# Patient Record
Sex: FEMALE | Race: ASIAN | NOT HISPANIC OR LATINO | URBAN - METROPOLITAN AREA
[De-identification: names, ages, dates, MRNs, and addresses within clinical notes are randomized per-mention and may not be internally consistent; named-entity substitution may affect disease eponyms.]

---

## 2018-10-01 ENCOUNTER — OUTPATIENT (OUTPATIENT)
Dept: OUTPATIENT SERVICES | Facility: HOSPITAL | Age: 83
LOS: 1 days | End: 2018-10-01
Payer: MEDICAID

## 2018-10-01 PROCEDURE — G9001: CPT

## 2018-10-17 ENCOUNTER — INPATIENT (INPATIENT)
Facility: HOSPITAL | Age: 83
LOS: 1 days | Discharge: HOME | DRG: 187 | End: 2018-10-19
Attending: SURGERY | Admitting: SURGERY
Payer: MEDICAID

## 2018-10-17 ENCOUNTER — EMERGENCY (EMERGENCY)
Facility: HOSPITAL | Age: 83
LOS: 1 days | Discharge: ANOTHER HOSPITAL | End: 2018-10-17
Attending: EMERGENCY MEDICINE
Payer: MEDICAID

## 2018-10-17 VITALS
TEMPERATURE: 98 F | OXYGEN SATURATION: 95 % | WEIGHT: 149.91 LBS | DIASTOLIC BLOOD PRESSURE: 67 MMHG | SYSTOLIC BLOOD PRESSURE: 163 MMHG | HEIGHT: 64 IN | RESPIRATION RATE: 17 BRPM | HEART RATE: 82 BPM

## 2018-10-17 VITALS
WEIGHT: 149.91 LBS | SYSTOLIC BLOOD PRESSURE: 162 MMHG | HEIGHT: 64 IN | DIASTOLIC BLOOD PRESSURE: 74 MMHG | RESPIRATION RATE: 18 BRPM | OXYGEN SATURATION: 98 % | HEART RATE: 70 BPM

## 2018-10-17 VITALS
HEART RATE: 82 BPM | RESPIRATION RATE: 20 BRPM | OXYGEN SATURATION: 100 % | TEMPERATURE: 98 F | SYSTOLIC BLOOD PRESSURE: 154 MMHG | DIASTOLIC BLOOD PRESSURE: 78 MMHG

## 2018-10-17 DIAGNOSIS — S22.39XA FRACTURE OF ONE RIB, UNSPECIFIED SIDE, INITIAL ENCOUNTER FOR CLOSED FRACTURE: ICD-10-CM

## 2018-10-17 DIAGNOSIS — C23 MALIGNANT NEOPLASM OF GALLBLADDER: Chronic | ICD-10-CM

## 2018-10-17 LAB
ACETONE SERPL-MCNC: NEGATIVE — SIGNIFICANT CHANGE UP
ALBUMIN SERPL ELPH-MCNC: 3.1 G/DL — LOW (ref 3.5–5)
ALP SERPL-CCNC: 65 U/L — SIGNIFICANT CHANGE UP (ref 40–120)
ALT FLD-CCNC: 83 U/L DA — HIGH (ref 10–60)
AMMONIA BLD-MCNC: 25 UMOL/L — SIGNIFICANT CHANGE UP (ref 11–32)
ANION GAP SERPL CALC-SCNC: 16 MMOL/L — SIGNIFICANT CHANGE UP (ref 5–17)
ANION GAP SERPL CALC-SCNC: 8 MMOL/L — SIGNIFICANT CHANGE UP (ref 5–17)
APPEARANCE UR: CLEAR — SIGNIFICANT CHANGE UP
APTT BLD: 27.6 SEC — SIGNIFICANT CHANGE UP (ref 27.5–37.4)
AST SERPL-CCNC: 66 U/L — HIGH (ref 10–40)
BACTERIA # UR AUTO: ABNORMAL /HPF
BASOPHILS # BLD AUTO: 0 K/UL — SIGNIFICANT CHANGE UP (ref 0–0.2)
BASOPHILS NFR BLD AUTO: 0.6 % — SIGNIFICANT CHANGE UP (ref 0–2)
BILIRUB SERPL-MCNC: 1.1 MG/DL — SIGNIFICANT CHANGE UP (ref 0.2–1.2)
BILIRUB UR-MCNC: NEGATIVE — SIGNIFICANT CHANGE UP
BLD GP AB SCN SERPL QL: NEGATIVE — SIGNIFICANT CHANGE UP
BUN SERPL-MCNC: 14 MG/DL — SIGNIFICANT CHANGE UP (ref 7–18)
BUN SERPL-MCNC: 9 MG/DL — SIGNIFICANT CHANGE UP (ref 7–23)
CALCIUM SERPL-MCNC: 8 MG/DL — LOW (ref 8.4–10.5)
CALCIUM SERPL-MCNC: 8 MG/DL — LOW (ref 8.4–10.5)
CHLORIDE SERPL-SCNC: 104 MMOL/L — SIGNIFICANT CHANGE UP (ref 96–108)
CHLORIDE SERPL-SCNC: 104 MMOL/L — SIGNIFICANT CHANGE UP (ref 96–108)
CK MB BLD-MCNC: 1.1 % — SIGNIFICANT CHANGE UP (ref 0–3.5)
CK MB CFR SERPL CALC: 3.5 NG/ML — SIGNIFICANT CHANGE UP (ref 0–3.6)
CK SERPL-CCNC: 314 U/L — HIGH (ref 21–215)
CO2 SERPL-SCNC: 17 MMOL/L — LOW (ref 22–31)
CO2 SERPL-SCNC: 26 MMOL/L — SIGNIFICANT CHANGE UP (ref 22–31)
COLOR SPEC: YELLOW — SIGNIFICANT CHANGE UP
COMMENT - URINE: SIGNIFICANT CHANGE UP
CREAT SERPL-MCNC: 0.73 MG/DL — SIGNIFICANT CHANGE UP (ref 0.5–1.3)
CREAT SERPL-MCNC: 1.11 MG/DL — SIGNIFICANT CHANGE UP (ref 0.5–1.3)
DIFF PNL FLD: ABNORMAL
EOSINOPHIL # BLD AUTO: 0.1 K/UL — SIGNIFICANT CHANGE UP (ref 0–0.5)
EOSINOPHIL NFR BLD AUTO: 1.2 % — SIGNIFICANT CHANGE UP (ref 0–6)
EPI CELLS # UR: ABNORMAL /HPF
GLUCOSE SERPL-MCNC: 120 MG/DL — HIGH (ref 70–99)
GLUCOSE SERPL-MCNC: 176 MG/DL — HIGH (ref 70–99)
GLUCOSE UR QL: NEGATIVE — SIGNIFICANT CHANGE UP
HCT VFR BLD CALC: 25.8 % — LOW (ref 34.5–45)
HCT VFR BLD CALC: 26 % — LOW (ref 34.5–45)
HGB BLD-MCNC: 8.5 G/DL — LOW (ref 11.5–15.5)
HGB BLD-MCNC: 8.9 G/DL — LOW (ref 11.5–15.5)
INR BLD: 1.2 RATIO — HIGH (ref 0.88–1.16)
KETONES UR-MCNC: NEGATIVE — SIGNIFICANT CHANGE UP
LACTATE SERPL-SCNC: 1.2 MMOL/L — SIGNIFICANT CHANGE UP (ref 0.7–2)
LEUKOCYTE ESTERASE UR-ACNC: ABNORMAL
LYMPHOCYTES # BLD AUTO: 0.8 K/UL — LOW (ref 1–3.3)
LYMPHOCYTES # BLD AUTO: 11.2 % — LOW (ref 13–44)
MAGNESIUM SERPL-MCNC: 2.1 MG/DL — SIGNIFICANT CHANGE UP (ref 1.6–2.6)
MCHC RBC-ENTMCNC: 31.3 PG — SIGNIFICANT CHANGE UP (ref 27–34)
MCHC RBC-ENTMCNC: 32.8 PG — SIGNIFICANT CHANGE UP (ref 27–34)
MCHC RBC-ENTMCNC: 33 GM/DL — SIGNIFICANT CHANGE UP (ref 32–36)
MCHC RBC-ENTMCNC: 34.4 GM/DL — SIGNIFICANT CHANGE UP (ref 32–36)
MCV RBC AUTO: 95 FL — SIGNIFICANT CHANGE UP (ref 80–100)
MCV RBC AUTO: 95.4 FL — SIGNIFICANT CHANGE UP (ref 80–100)
MONOCYTES # BLD AUTO: 0.5 K/UL — SIGNIFICANT CHANGE UP (ref 0–0.9)
MONOCYTES NFR BLD AUTO: 7.5 % — SIGNIFICANT CHANGE UP (ref 2–14)
NEUTROPHILS # BLD AUTO: 5.7 K/UL — SIGNIFICANT CHANGE UP (ref 1.8–7.4)
NEUTROPHILS NFR BLD AUTO: 79.5 % — HIGH (ref 43–77)
NITRITE UR-MCNC: NEGATIVE — SIGNIFICANT CHANGE UP
NT-PROBNP SERPL-SCNC: 5086 PG/ML — HIGH (ref 0–450)
PH UR: 6 — SIGNIFICANT CHANGE UP (ref 5–8)
PLATELET # BLD AUTO: 141 K/UL — LOW (ref 150–400)
PLATELET # BLD AUTO: 144 K/UL — LOW (ref 150–400)
POTASSIUM SERPL-MCNC: 3.7 MMOL/L — SIGNIFICANT CHANGE UP (ref 3.5–5.3)
POTASSIUM SERPL-MCNC: 4.3 MMOL/L — SIGNIFICANT CHANGE UP (ref 3.5–5.3)
POTASSIUM SERPL-SCNC: 3.7 MMOL/L — SIGNIFICANT CHANGE UP (ref 3.5–5.3)
POTASSIUM SERPL-SCNC: 4.3 MMOL/L — SIGNIFICANT CHANGE UP (ref 3.5–5.3)
PROT SERPL-MCNC: 6.4 G/DL — SIGNIFICANT CHANGE UP (ref 6–8.3)
PROT UR-MCNC: 100
PROTHROM AB SERPL-ACNC: 13.1 SEC — HIGH (ref 9.8–12.7)
RBC # BLD: 2.71 M/UL — LOW (ref 3.8–5.2)
RBC # BLD: 2.72 M/UL — LOW (ref 3.8–5.2)
RBC # FLD: 13.6 % — SIGNIFICANT CHANGE UP (ref 10.3–14.5)
RBC # FLD: 14 % — SIGNIFICANT CHANGE UP (ref 10.3–14.5)
RBC CASTS # UR COMP ASSIST: ABNORMAL /HPF (ref 0–2)
RH IG SCN BLD-IMP: POSITIVE — SIGNIFICANT CHANGE UP
SODIUM SERPL-SCNC: 137 MMOL/L — SIGNIFICANT CHANGE UP (ref 135–145)
SODIUM SERPL-SCNC: 138 MMOL/L — SIGNIFICANT CHANGE UP (ref 135–145)
SP GR SPEC: 1.01 — SIGNIFICANT CHANGE UP (ref 1.01–1.02)
TROPONIN I SERPL-MCNC: <0.015 NG/ML — SIGNIFICANT CHANGE UP (ref 0–0.04)
UROBILINOGEN FLD QL: 1
WBC # BLD: 6.4 K/UL — SIGNIFICANT CHANGE UP (ref 3.8–10.5)
WBC # BLD: 7.2 K/UL — SIGNIFICANT CHANGE UP (ref 3.8–10.5)
WBC # FLD AUTO: 6.4 K/UL — SIGNIFICANT CHANGE UP (ref 3.8–10.5)
WBC # FLD AUTO: 7.2 K/UL — SIGNIFICANT CHANGE UP (ref 3.8–10.5)
WBC UR QL: SIGNIFICANT CHANGE UP /HPF (ref 0–5)

## 2018-10-17 PROCEDURE — 74177 CT ABD & PELVIS W/CONTRAST: CPT

## 2018-10-17 PROCEDURE — 70450 CT HEAD/BRAIN W/O DYE: CPT | Mod: 26

## 2018-10-17 PROCEDURE — 82009 KETONE BODYS QUAL: CPT

## 2018-10-17 PROCEDURE — 99285 EMERGENCY DEPT VISIT HI MDM: CPT | Mod: 25

## 2018-10-17 PROCEDURE — 85027 COMPLETE CBC AUTOMATED: CPT

## 2018-10-17 PROCEDURE — 96375 TX/PRO/DX INJ NEW DRUG ADDON: CPT

## 2018-10-17 PROCEDURE — 83735 ASSAY OF MAGNESIUM: CPT

## 2018-10-17 PROCEDURE — 99283 EMERGENCY DEPT VISIT LOW MDM: CPT

## 2018-10-17 PROCEDURE — 82140 ASSAY OF AMMONIA: CPT

## 2018-10-17 PROCEDURE — 83880 ASSAY OF NATRIURETIC PEPTIDE: CPT

## 2018-10-17 PROCEDURE — 71260 CT THORAX DX C+: CPT

## 2018-10-17 PROCEDURE — 85730 THROMBOPLASTIN TIME PARTIAL: CPT

## 2018-10-17 PROCEDURE — 99285 EMERGENCY DEPT VISIT HI MDM: CPT

## 2018-10-17 PROCEDURE — 81001 URINALYSIS AUTO W/SCOPE: CPT

## 2018-10-17 PROCEDURE — 93005 ELECTROCARDIOGRAM TRACING: CPT

## 2018-10-17 PROCEDURE — 82550 ASSAY OF CK (CPK): CPT

## 2018-10-17 PROCEDURE — 82962 GLUCOSE BLOOD TEST: CPT

## 2018-10-17 PROCEDURE — 71260 CT THORAX DX C+: CPT | Mod: 26

## 2018-10-17 PROCEDURE — 74177 CT ABD & PELVIS W/CONTRAST: CPT | Mod: 26

## 2018-10-17 PROCEDURE — 85610 PROTHROMBIN TIME: CPT

## 2018-10-17 PROCEDURE — 87086 URINE CULTURE/COLONY COUNT: CPT

## 2018-10-17 PROCEDURE — 96374 THER/PROPH/DIAG INJ IV PUSH: CPT | Mod: XU

## 2018-10-17 PROCEDURE — 70450 CT HEAD/BRAIN W/O DYE: CPT

## 2018-10-17 PROCEDURE — 80053 COMPREHEN METABOLIC PANEL: CPT

## 2018-10-17 PROCEDURE — 96361 HYDRATE IV INFUSION ADD-ON: CPT

## 2018-10-17 PROCEDURE — 82553 CREATINE MB FRACTION: CPT

## 2018-10-17 PROCEDURE — 84484 ASSAY OF TROPONIN QUANT: CPT

## 2018-10-17 PROCEDURE — 83605 ASSAY OF LACTIC ACID: CPT

## 2018-10-17 PROCEDURE — 87040 BLOOD CULTURE FOR BACTERIA: CPT

## 2018-10-17 PROCEDURE — 96376 TX/PRO/DX INJ SAME DRUG ADON: CPT

## 2018-10-17 RX ORDER — AMLODIPINE BESYLATE 2.5 MG/1
5 TABLET ORAL DAILY
Qty: 0 | Refills: 0 | Status: DISCONTINUED | OUTPATIENT
Start: 2018-10-17 | End: 2018-10-19

## 2018-10-17 RX ORDER — SODIUM CHLORIDE 9 MG/ML
1000 INJECTION INTRAMUSCULAR; INTRAVENOUS; SUBCUTANEOUS ONCE
Qty: 0 | Refills: 0 | Status: COMPLETED | OUTPATIENT
Start: 2018-10-17 | End: 2018-10-17

## 2018-10-17 RX ORDER — ONDANSETRON 8 MG/1
4 TABLET, FILM COATED ORAL ONCE
Qty: 0 | Refills: 0 | Status: COMPLETED | OUTPATIENT
Start: 2018-10-17 | End: 2018-10-17

## 2018-10-17 RX ORDER — ATORVASTATIN CALCIUM 80 MG/1
10 TABLET, FILM COATED ORAL AT BEDTIME
Qty: 0 | Refills: 0 | Status: DISCONTINUED | OUTPATIENT
Start: 2018-10-17 | End: 2018-10-17

## 2018-10-17 RX ORDER — ISOSORBIDE DINITRATE 5 MG/1
5 TABLET ORAL
Qty: 0 | Refills: 0 | Status: DISCONTINUED | OUTPATIENT
Start: 2018-10-17 | End: 2018-10-19

## 2018-10-17 RX ORDER — SODIUM CHLORIDE 9 MG/ML
1000 INJECTION, SOLUTION INTRAVENOUS
Qty: 0 | Refills: 0 | Status: DISCONTINUED | OUTPATIENT
Start: 2018-10-17 | End: 2018-10-19

## 2018-10-17 RX ORDER — DEXTROSE 50 % IN WATER 50 %
25 SYRINGE (ML) INTRAVENOUS ONCE
Qty: 0 | Refills: 0 | Status: DISCONTINUED | OUTPATIENT
Start: 2018-10-17 | End: 2018-10-19

## 2018-10-17 RX ORDER — DEXTROSE 50 % IN WATER 50 %
12.5 SYRINGE (ML) INTRAVENOUS ONCE
Qty: 0 | Refills: 0 | Status: DISCONTINUED | OUTPATIENT
Start: 2018-10-17 | End: 2018-10-19

## 2018-10-17 RX ORDER — INSULIN LISPRO 100/ML
VIAL (ML) SUBCUTANEOUS AT BEDTIME
Qty: 0 | Refills: 0 | Status: DISCONTINUED | OUTPATIENT
Start: 2018-10-17 | End: 2018-10-19

## 2018-10-17 RX ORDER — ENOXAPARIN SODIUM 100 MG/ML
40 INJECTION SUBCUTANEOUS DAILY
Qty: 0 | Refills: 0 | Status: DISCONTINUED | OUTPATIENT
Start: 2018-10-17 | End: 2018-10-19

## 2018-10-17 RX ORDER — ACETAMINOPHEN 500 MG
650 TABLET ORAL EVERY 6 HOURS
Qty: 0 | Refills: 0 | Status: DISCONTINUED | OUTPATIENT
Start: 2018-10-17 | End: 2018-10-18

## 2018-10-17 RX ORDER — DEXTROSE 50 % IN WATER 50 %
15 SYRINGE (ML) INTRAVENOUS ONCE
Qty: 0 | Refills: 0 | Status: DISCONTINUED | OUTPATIENT
Start: 2018-10-17 | End: 2018-10-19

## 2018-10-17 RX ORDER — IBUPROFEN 200 MG
400 TABLET ORAL EVERY 6 HOURS
Qty: 0 | Refills: 0 | Status: DISCONTINUED | OUTPATIENT
Start: 2018-10-17 | End: 2018-10-17

## 2018-10-17 RX ORDER — MORPHINE SULFATE 50 MG/1
4 CAPSULE, EXTENDED RELEASE ORAL ONCE
Qty: 0 | Refills: 0 | Status: DISCONTINUED | OUTPATIENT
Start: 2018-10-17 | End: 2018-10-17

## 2018-10-17 RX ORDER — SODIUM CHLORIDE 9 MG/ML
1000 INJECTION INTRAMUSCULAR; INTRAVENOUS; SUBCUTANEOUS
Qty: 0 | Refills: 0 | Status: DISCONTINUED | OUTPATIENT
Start: 2018-10-17 | End: 2018-10-21

## 2018-10-17 RX ORDER — INSULIN LISPRO 100/ML
VIAL (ML) SUBCUTANEOUS
Qty: 0 | Refills: 0 | Status: DISCONTINUED | OUTPATIENT
Start: 2018-10-17 | End: 2018-10-19

## 2018-10-17 RX ORDER — GLUCAGON INJECTION, SOLUTION 0.5 MG/.1ML
1 INJECTION, SOLUTION SUBCUTANEOUS ONCE
Qty: 0 | Refills: 0 | Status: DISCONTINUED | OUTPATIENT
Start: 2018-10-17 | End: 2018-10-19

## 2018-10-17 RX ORDER — LIDOCAINE 4 G/100G
1 CREAM TOPICAL DAILY
Qty: 0 | Refills: 0 | Status: DISCONTINUED | OUTPATIENT
Start: 2018-10-17 | End: 2018-10-19

## 2018-10-17 RX ORDER — TRAMADOL HYDROCHLORIDE 50 MG/1
25 TABLET ORAL EVERY 6 HOURS
Qty: 0 | Refills: 0 | Status: DISCONTINUED | OUTPATIENT
Start: 2018-10-17 | End: 2018-10-19

## 2018-10-17 RX ORDER — OXYCODONE HYDROCHLORIDE 5 MG/1
5 TABLET ORAL EVERY 4 HOURS
Qty: 0 | Refills: 0 | Status: DISCONTINUED | OUTPATIENT
Start: 2018-10-17 | End: 2018-10-19

## 2018-10-17 RX ADMIN — ONDANSETRON 4 MILLIGRAM(S): 8 TABLET, FILM COATED ORAL at 11:02

## 2018-10-17 RX ADMIN — MORPHINE SULFATE 4 MILLIGRAM(S): 50 CAPSULE, EXTENDED RELEASE ORAL at 11:43

## 2018-10-17 RX ADMIN — MORPHINE SULFATE 4 MILLIGRAM(S): 50 CAPSULE, EXTENDED RELEASE ORAL at 16:57

## 2018-10-17 RX ADMIN — SODIUM CHLORIDE 125 MILLILITER(S): 9 INJECTION INTRAMUSCULAR; INTRAVENOUS; SUBCUTANEOUS at 11:43

## 2018-10-17 RX ADMIN — MORPHINE SULFATE 4 MILLIGRAM(S): 50 CAPSULE, EXTENDED RELEASE ORAL at 14:23

## 2018-10-17 RX ADMIN — SODIUM CHLORIDE 1000 MILLILITER(S): 9 INJECTION INTRAMUSCULAR; INTRAVENOUS; SUBCUTANEOUS at 12:32

## 2018-10-17 RX ADMIN — ONDANSETRON 4 MILLIGRAM(S): 8 TABLET, FILM COATED ORAL at 14:23

## 2018-10-17 RX ADMIN — SODIUM CHLORIDE 1000 MILLILITER(S): 9 INJECTION INTRAMUSCULAR; INTRAVENOUS; SUBCUTANEOUS at 11:02

## 2018-10-17 RX ADMIN — MORPHINE SULFATE 4 MILLIGRAM(S): 50 CAPSULE, EXTENDED RELEASE ORAL at 11:02

## 2018-10-17 NOTE — ED ADULT NURSE NOTE - CHIEF COMPLAINT QUOTE
Lethargic post fall x 2 since 1 am.s/p partial resection of liver and cholecystectomy on 10/11/2018,d/padmaja from [facility] yesterday

## 2018-10-17 NOTE — ED PROVIDER NOTE - PROGRESS NOTE DETAILS
Pt with Rt rib 4-6th fracture, will transfer.  As per granddaughter, pt had ascencio placed during her hospitalization @ [facility]

## 2018-10-17 NOTE — H&P ADULT - ATTENDING COMMENTS
Pt seen and examined.   Chart reviewed.  Resident note confirmed.  Pt is a 83 year old female s/p mechanical fall  Pt sustained 3 right rib fx  Pt with recent cholecystectomy/hepatic rsn  ELevated BNP and bilateral pleural effusions noted  Start multimodal pain control  EKG/Echocardiogram/troponin/cardiology consult  ISP  Reg diet  PT evaluation

## 2018-10-17 NOTE — ED PROVIDER NOTE - CHPI ED SYMPTOMS NEG
no headache/no loss of consciousness/no nausea/no dizziness/no fever/no decreased eating/drinking/no vomiting

## 2018-10-17 NOTE — ED ADULT NURSE NOTE - ED STAT RN HANDOFF DETAILS
endorsed to MAL Jain in G2 in stable condition for continuation of care. 20G left cephalic. family at bedside.

## 2018-10-17 NOTE — ED ADULT NURSE NOTE - OBJECTIVE STATEMENT
pt a&ox3, BIBEMS from home for lethargy. granddaughter jass sandhu at bedside. as per granddaughter, pt fell last night and again this morning. no LOC. bruising to right rib area, lower abdomen, left wrist.

## 2018-10-17 NOTE — ED PROVIDER NOTE - OBJECTIVE STATEMENT
83 y.o. 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  Pt was d/c home yest.  Pt with h/o NIDDM, last dose this am.  Pt tried going to bathroom last night, fell, then crawled to bathroom.  Pt reported with visual hallucination, no head injury.  Pt went back to bed.  Pt unable to fall asleep due to visual hallucination, pt then took Xanax(she brought from [place]), & finally fell asleep.  This am, pt got up & fell on her Rt side.  Pt c/o feeling weak. no LOC 83 y.o. female BIBA as per grand daughter, pt had robotic cholecystectomy & partial liver resection @ 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  Pt was d/c home yest.  Pt with h/o NIDDM, last dose this am.  Pt tried going to bathroom last night, fell, then crawled to bathroom.  Pt reported with visual hallucination, no head injury.  Pt went back to bed.  Pt unable to fall asleep due to visual hallucination, pt then took Xanax(she brought from [place]), & finally fell asleep.  This am, pt got up & fell on her Rt side.  Pt c/o feeling weak. no LOC, last BM yest.

## 2018-10-17 NOTE — ED ADULT NURSE NOTE - NSIMPLEMENTINTERV_GEN_ALL_ED
Implemented All Fall with Harm Risk Interventions:  [place] to call system. Call bell, personal items and telephone within reach. Instruct patient to call for assistance. Room bathroom lighting operational. Non-slip footwear when patient is off stretcher. Physically safe environment: no spills, clutter or unnecessary equipment. Stretcher in lowest position, wheels locked, appropriate side rails in place. Provide visual cue, wrist band, yellow gown, etc. Monitor gait and stability. Monitor for mental status changes and reorient to person, place, and time. Review medications for side effects contributing to fall risk. Reinforce activity limits and safety measures with patient and family. Provide visual clues: red socks.

## 2018-10-17 NOTE — H&P ADULT - HISTORY OF PRESENT ILLNESS
TRAUMA ACTIVATION LEVEL: consult    MECHANISM OF INJURY: fall from standing       GCS: 15 	E: 4	V: 5	M: 6    HPI: patient is a 84 yo Mandarin speaking female with A. fib (not on anticoagulation), HTN, HLD, and DM came to the ER s/p fall this morning accompanied by her daughter and granddaughter (who is an urology PA here). Patient had a recent robotic cholecystectomy and liver resection for gallbladder tumor at [place] about 6 days ago. Patient was having trouble sleeping last night and took 3 tablets of ambient (unknown dosage) she brought from [place]. Patient started having vivid visual and auditory hallucinations after taking the medication for the rest of the evening and didn't get much sleep. This morning when standing up from toilet, she lost her balance and fell to her right side and hit her right flank on the bathtub on the way down. She denies any lightheadedness or dizziness before the fall. She denies head struck or loss of consciousness. Her daughter came into the bathroom after heard the noise and helped her up. Patient was able to ambulate into the kitchen with assistance and had breakfast. Patient was later brought to the [facility] due to persistent flank pain. Patient denies pain anywhere else. Of note, patient was sent home with rolling walker and PO morphine but patient prefers to walk on her own and only took Tylenol because of nausea from narcotics.     Primary Survey:   A - airway intact  B - bilateral breath sounds and good chest rise  C - initial BP  BP: 158/68 (10-17-18 @ 19:15) , HR HR: 74 (10-17-18 @ 19:15) , palpable pulses in all extremities  D - GCS 15 on arrival    Secondary Survey:   General: NAD  HEENT: Normocephalic, atraumatic, EOMI, PEERLA.  Neck: Soft, midline trachea.  Chest: chest wall tenderness over the right lateral side where patient endorses pain, no external injury noted   Cardiac: S1, S2, RRR  Respiratory: Bilateral breath sounds, clear and equal bilaterally  Abdomen: Soft, non-distended, non-tender, slight ecchymosis around port site which has crusted dermabond, there is a moderate sized ecchymosis extending just below the costal margin to the right anterior superior iliac crest, which has been there since surgery prior to fall.   Ext: palp radial b/l UE, b/l DP palp in Lower Extremities, motor and sensory grossly intact in all 4 extremities  Back: no TTP, no palpable runoff/stepoff/deformity, pelvic stable TRAUMA ACTIVATION LEVEL: consult    MECHANISM OF INJURY: fall from standing       GCS: 15 	E: 4	V: 5	M: 6    HPI: patient is a 84 yo Mandarin speaking female with A. fib (not on anticoagulation), HTN, HLD, and DM came to the ER s/p fall this morning accompanied by her daughter and granddaughter (who is an urology PA here). Patient had a recent robotic cholecystectomy and liver resection for gallbladder tumor at [place] about 6 days ago. Patient was having trouble sleeping last night and took 3 tablets of ambient (unknown dosage) she brought from [place]. Patient started having vivid visual and auditory hallucinations after taking the medication for the rest of the evening and didn't get much sleep. This morning when standing up from toilet, she lost her balance and fell to her right side and hit her right flank on the bathtub on the way down. She denies any lightheadedness or dizziness before the fall. She denies head struck or loss of consciousness. Her daughter came into the bathroom after heard the noise and helped her up. Patient was able to ambulate into the kitchen with assistance and had breakfast. Patient was later brought to the [facility] due to persistent flank pain. Patient denies pain anywhere else. Of note, patient was sent home with rolling walker and PO morphine but patient prefers to walk on her own and only took Tylenol because of nausea from narcotics. Patient is able to pull 1100 on incentive spirometry consistently     Primary Survey:   A - airway intact  B - bilateral breath sounds and good chest rise  C - initial BP  BP: 158/68 (10-17-18 @ 19:15) , HR HR: 74 (10-17-18 @ 19:15) , palpable pulses in all extremities  D - GCS 15 on arrival    Secondary Survey:   General: NAD  HEENT: Normocephalic, atraumatic, EOMI, PEERLA.  Neck: Soft, midline trachea.  Chest: chest wall tenderness over the right lateral side where patient endorses pain, no external injury noted   Cardiac: S1, S2, RRR  Respiratory: Bilateral breath sounds, clear and equal bilaterally  Abdomen: Soft, non-distended, non-tender, slight ecchymosis around port site which has crusted dermabond, there is a moderate sized ecchymosis extending just below the costal margin to the right anterior superior iliac crest, which has been there since surgery prior to fall.   Ext: palp radial b/l UE, b/l DP palp in Lower Extremities, motor and sensory grossly intact in all 4 extremities  Back: no TTP, no palpable runoff/stepoff/deformity, pelvic stable

## 2018-10-17 NOTE — CONSULT NOTE ADULT - ASSESSMENT
ASSESSMENT: Patient is a 83y old f with ***    PLAN:  ***  -   -   -   -   - Patient seen/examined with attending.  - Plan to be discussed with Attending,  Patient is a 83y old female with A. fib (not on anticoagulation), HTN, HLD, and DM  s/p robotic cholecystectomy and liver resection (10/11) presented as a transfer s/p standing from fall with right 4 to 6 lateral rib fracture with bilateral pleural effusion.       PLAN:  - No acute trauma surgical intervention indicated at the time   - Multimodal pain control with Tylenol, Motrin and lidocaine patch, judicious use of narcotics and sedation medication given history of hallucination   - Encourage incentive spirometry use while awake  - PT consult and encourage ambulation   - Plan to be discussed with Attending, Dr. Perea

## 2018-10-17 NOTE — CONSULT NOTE ADULT - SUBJECTIVE AND OBJECTIVE BOX
TRAUMA SERVICE (Acute Care Surgery / ACS - #8102) - CONSULT NOTE    TRAUMA ACTIVATION LEVEL: consult    MECHANISM OF INJURY: fall from standing       GCS: 15 	E: 4	V: 5	M: 6    HPI: patient is a 82 yo Mandarin speaking female came to the ER s/p fall this morning accompanied by her daughter and granddaughter (who is an urology PA here). Patient had a recent robotic cholecystectomy and liver resection for gallbladder tumor at [place]     Primary Survey:  ***  A - airway intact  B - bilateral breath sounds and good chest rise  C - initial BP  BP: 158/68 (10-17-18 @ 19:15) *** , HR HR: 74 (10-17-18 @ 19:15) *** , palpable pulses in all extremities  D - GCS 15 on arrival  Exposure obtained      Secondary Survey: ***  General: NAD  HEENT: Normocephalic, atraumatic, EOMI, PEERLA.  Neck: Soft, midline trachea.  Chest: No chest wall tenderness.   Cardiac: S1, S2, RRR  Respiratory: Bilateral breath sounds, clear and equal bilaterally  Abdomen: Soft, non-distended, non-tender, no rebound, no guarding, no masses palpated  Groin: Normal appearing  Ext: palp radial b/l UE, b/l DP palp in Lower Extrem, motor and sensory grossly intact in all 4 extremities  Back: no TTP, no palpable runoff/stepoff/deformity  Rectal: No daniel blood, KIMBERLY with good tone    Patient denies fevers/chills, denies lightheadedness/dizziness, denies SOB/chest pain, denies nausea/vomiting, denies constipation/diarrhea.  ***    ROS: 10-system review is otherwise negative except HPI above.      PAST MEDICAL & SURGICAL HISTORY:  Afib  DM (diabetes mellitus)  HTN (hypertension)    FAMILY HISTORY:    [] Family history not pertinent as reviewed with the patient and family    SOCIAL HISTORY:  ***    ALLERGIES: No Known Allergies      HOME MEDICATIONS: ***    CURRENT MEDICATIONS  MEDICATIONS (STANDING):   MEDICATIONS (PRN):  --------------------------------------------------------------------------------------------    Vitals:   T(C): 36.3 (10-17-18 @ 18:35), Max: 36.3 (10-17-18 @ 18:35)  HR: 74 (10-17-18 @ 19:15) (67 - 80)  BP: 158/68 (10-17-18 @ 19:15) (153/75 - 172/91)  RR: 16 (10-17-18 @ 19:15) (16 - 20)  SpO2: 100% (10-17-18 @ 19:15) (98% - 100%)  CAPILLARY BLOOD GLUCOSE        CAPILLARY BLOOD GLUCOSE          Height (cm): 162.56 (10-17 @ 18:15)  Weight (kg): 68 (10-17 @ 18:15)  BMI (kg/m2): 25.7 (10-17 @ 18:15)  BSA (m2): 1.73 (10-17 @ 18:15)    PHYSICAL EXAM: ***  General: NAD  HEENT: Normocephalic, atraumatic, EOMI, PEERLA.  Neck: Soft, midline trachea.  Chest: No chest wall tenderness.   Cardiac: S1, S2, RRR  Respiratory: Bilateral breath sounds, clear and equal bilaterally  Abdomen: Soft, non-distended, non-tender TTP periumbilically, no rebound, +guarding  Groin: Normal appearing  Ext: palp radial b/l UE, b/l DP palp in Lower Extrem.   Back: no TTP, no palpable runoff/stepoff/deformity  Rectal: No daniel blood, KIMBERLY with good tone    --------------------------------------------------------------------------------------------    LABS                --------------------------------------------------------------------------------------------    MICROBIOLOGY      --------------------------------------------------------------------------------------------    IMAGING  ***    --------------------------------------------------------------------------------------------    ASSESSMENT: Patient is a 83y old f with ***    PLAN:  ***  -   -   -   -   - Patient seen/examined with attending.  - Plan to be discussed with Attending,  TRAUMA SERVICE (Acute Care Surgery / ACS - #7797) - CONSULT NOTE    TRAUMA ACTIVATION LEVEL: consult    MECHANISM OF INJURY: fall from standing       GCS: 15 	E: 4	V: 5	M: 6    HPI: patient is a 82 yo Mandarin speaking female came to the ER s/p fall this morning accompanied by her daughter and granddaughter (who is an urology PA here). Patient had a recent robotic cholecystectomy and liver resection for gallbladder tumor at [place] about 6 days ago. Patient was having trouble sleeping last night and took 3 tablets of ambient (unknown dosage) she brought from [place]. Patient started having vivid visual and auditory hallucinations after taking the medication for the rest of the evening and didn't get much sleep. This morning when standing up from toilet, she lost her balance and fell to her right side and hit her right flank on the bathtub on the way down. She denies any lightheadedness or dizziness before the fall. She denies head struck or loss of consciousness. Her daughter came into the bathroom after heard the noise and helped her up. Patient was able to ambulate into the kitchen with assistance and had breakfast. Patient was later brought to the [facility] due to persistent flank pain. Patient denies pain anywhere else. Of note, patient was sent home with rolling walker and PO morphine but patient prefers to walk on her own and only took Tylenol because of nausea from narcotics.     Primary Survey:   A - airway intact  B - bilateral breath sounds and good chest rise  C - initial BP  BP: 158/68 (10-17-18 @ 19:15) *** , HR HR: 74 (10-17-18 @ 19:15) *** , palpable pulses in all extremities  D - GCS 15 on arrival  Exposure obtained      Secondary Survey: ***  General: NAD  HEENT: Normocephalic, atraumatic, EOMI, PEERLA.  Neck: Soft, midline trachea.  Chest: No chest wall tenderness.   Cardiac: S1, S2, RRR  Respiratory: Bilateral breath sounds, clear and equal bilaterally  Abdomen: Soft, non-distended, non-tender, no rebound, no guarding, no masses palpated  Groin: Normal appearing  Ext: palp radial b/l UE, b/l DP palp in Lower Extrem, motor and sensory grossly intact in all 4 extremities  Back: no TTP, no palpable runoff/stepoff/deformity  Rectal: No daniel blood, KIMBERLY with good tone    Patient denies fevers/chills, denies lightheadedness/dizziness, denies SOB/chest pain, denies nausea/vomiting, denies constipation/diarrhea.  ***    ROS: 10-system review is otherwise negative except HPI above.      PAST MEDICAL & SURGICAL HISTORY:  Afib  DM (diabetes mellitus)  HTN (hypertension)    FAMILY HISTORY:    [] Family history not pertinent as reviewed with the patient and family    SOCIAL HISTORY:  ***    ALLERGIES: No Known Allergies      HOME MEDICATIONS: ***    CURRENT MEDICATIONS  MEDICATIONS (STANDING):   MEDICATIONS (PRN):  --------------------------------------------------------------------------------------------    Vitals:   T(C): 36.3 (10-17-18 @ 18:35), Max: 36.3 (10-17-18 @ 18:35)  HR: 74 (10-17-18 @ 19:15) (67 - 80)  BP: 158/68 (10-17-18 @ 19:15) (153/75 - 172/91)  RR: 16 (10-17-18 @ 19:15) (16 - 20)  SpO2: 100% (10-17-18 @ 19:15) (98% - 100%)  CAPILLARY BLOOD GLUCOSE        CAPILLARY BLOOD GLUCOSE          Height (cm): 162.56 (10-17 @ 18:15)  Weight (kg): 68 (10-17 @ 18:15)  BMI (kg/m2): 25.7 (10-17 @ 18:15)  BSA (m2): 1.73 (10-17 @ 18:15)    PHYSICAL EXAM: ***  General: NAD  HEENT: Normocephalic, atraumatic, EOMI, PEERLA.  Neck: Soft, midline trachea.  Chest: No chest wall tenderness.   Cardiac: S1, S2, RRR  Respiratory: Bilateral breath sounds, clear and equal bilaterally  Abdomen: Soft, non-distended, non-tender TTP periumbilically, no rebound, +guarding  Groin: Normal appearing  Ext: palp radial b/l UE, b/l DP palp in Lower Extrem.   Back: no TTP, no palpable runoff/stepoff/deformity  Rectal: No daniel blood, KIMBERLY with good tone    --------------------------------------------------------------------------------------------    LABS                --------------------------------------------------------------------------------------------    MICROBIOLOGY      --------------------------------------------------------------------------------------------    IMAGING  ***    --------------------------------------------------------------------------------------------    ASSESSMENT: Patient is a 83y old f with ***    PLAN:  ***  -   -   -   -   - Patient seen/examined with attending.  - Plan to be discussed with Attending,  TRAUMA SERVICE (Acute Care Surgery / ACS - #9807) - CONSULT NOTE    TRAUMA ACTIVATION LEVEL: consult    MECHANISM OF INJURY: fall from standing       GCS: 15 	E: 4	V: 5	M: 6    HPI: patient is a 82 yo Mandarin speaking female with A. fib (not on anticoagulation), HTN, HLD, and DM came to the ER s/p fall this morning accompanied by her daughter and granddaughter (who is an urology PA here). Patient had a recent robotic cholecystectomy and liver resection for gallbladder tumor at [place] about 6 days ago. Patient was having trouble sleeping last night and took 3 tablets of ambient (unknown dosage) she brought from [place]. Patient started having vivid visual and auditory hallucinations after taking the medication for the rest of the evening and didn't get much sleep. This morning when standing up from toilet, she lost her balance and fell to her right side and hit her right flank on the bathtub on the way down. She denies any lightheadedness or dizziness before the fall. She denies head struck or loss of consciousness. Her daughter came into the bathroom after heard the noise and helped her up. Patient was able to ambulate into the kitchen with assistance and had breakfast. Patient was later brought to the [facility] due to persistent flank pain. Patient denies pain anywhere else. Of note, patient was sent home with rolling walker and PO morphine but patient prefers to walk on her own and only took Tylenol because of nausea from narcotics.     Primary Survey:   A - airway intact  B - bilateral breath sounds and good chest rise  C - initial BP  BP: 158/68 (10-17-18 @ 19:15) , HR HR: 74 (10-17-18 @ 19:15) , palpable pulses in all extremities  D - GCS 15 on arrival    Secondary Survey:   General: NAD  HEENT: Normocephalic, atraumatic, EOMI, PEERLA.  Neck: Soft, midline trachea.  Chest: chest wall tenderness over the right lateral side where patient endorses pain, no external injury noted   Cardiac: S1, S2, RRR  Respiratory: Bilateral breath sounds, clear and equal bilaterally  Abdomen: Soft, non-distended, non-tender, slight ecchymosis around port site which has crusted dermabond, there is a moderate sized ecchymosis extending just below the costal margin to the right anterior superior iliac crest, which has been there since surgery prior to fall.   Ext: palp radial b/l UE, b/l DP palp in Lower Extremities, motor and sensory grossly intact in all 4 extremities  Back: no TTP, no palpable runoff/stepoff/deformity, pelvic stable     ROS: 10-system review is otherwise negative except HPI above.      PAST MEDICAL & SURGICAL HISTORY:  Afib  DM (diabetes mellitus)  HTN (hypertension)    FAMILY HISTORY: Family history not pertinent as reviewed with the patient and family    SOCIAL HISTORY: lives with family, no history of smoking, drinking or substance use     ALLERGIES: No Known Allergies    HOME MEDICATIONS:   Amlodipine 5 mg qd  Atorvastatin 10 mg qd  Enalapril maleate 20 mg qd  Glimepiride 4mg qd  Isosorbide dinitrate 5 mg bid  Januvia 25mg qd    LABS    IMAGING TRAUMA SERVICE (Acute Care Surgery / ACS - #6760) - CONSULT NOTE    TRAUMA ACTIVATION LEVEL: consult    MECHANISM OF INJURY: fall from standing       GCS: 15 	E: 4	V: 5	M: 6    HPI: patient is a 84 yo Mandarin speaking female with A. fib (not on anticoagulation), HTN, HLD, and DM came to the ER s/p fall this morning accompanied by her daughter and granddaughter (who is an urology PA here). Patient had a recent robotic cholecystectomy and liver resection for gallbladder tumor at [place] about 6 days ago. Patient was having trouble sleeping last night and took 3 tablets of ambient (unknown dosage) she brought from [place]. Patient started having vivid visual and auditory hallucinations after taking the medication for the rest of the evening and didn't get much sleep. This morning when standing up from toilet, she lost her balance and fell to her right side and hit her right flank on the bathtub on the way down. She denies any lightheadedness or dizziness before the fall. She denies head struck or loss of consciousness. Her daughter came into the bathroom after heard the noise and helped her up. Patient was able to ambulate into the kitchen with assistance and had breakfast. Patient was later brought to the [facility] due to persistent flank pain. Patient denies pain anywhere else. Of note, patient was sent home with rolling walker and PO morphine but patient prefers to walk on her own and only took Tylenol because of nausea from narcotics.     Primary Survey:   A - airway intact  B - bilateral breath sounds and good chest rise  C - initial BP  BP: 158/68 (10-17-18 @ 19:15) , HR HR: 74 (10-17-18 @ 19:15) , palpable pulses in all extremities  D - GCS 15 on arrival    Secondary Survey:   General: NAD  HEENT: Normocephalic, atraumatic, EOMI, PEERLA.  Neck: Soft, midline trachea.  Chest: chest wall tenderness over the right lateral side where patient endorses pain, no external injury noted   Cardiac: S1, S2, RRR  Respiratory: Bilateral breath sounds, clear and equal bilaterally  Abdomen: Soft, non-distended, non-tender, slight ecchymosis around port site which has crusted dermabond, there is a moderate sized ecchymosis extending just below the costal margin to the right anterior superior iliac crest, which has been there since surgery prior to fall.   Ext: palp radial b/l UE, b/l DP palp in Lower Extremities, motor and sensory grossly intact in all 4 extremities  Back: no TTP, no palpable runoff/stepoff/deformity, pelvic stable     ROS: 10-system review is otherwise negative except HPI above.      PAST MEDICAL & SURGICAL HISTORY:  Afib  DM (diabetes mellitus)  HTN (hypertension)    FAMILY HISTORY: Family history not pertinent as reviewed with the patient and family    SOCIAL HISTORY: lives with family, no history of smoking, drinking or substance use     ALLERGIES: No Known Allergies    HOME MEDICATIONS:   Amlodipine 5 mg qd  Atorvastatin 10 mg qd  Enalapril maleate 20 mg qd  Glimepiride 4mg qd  Isosorbide dinitrate 5 mg bid  Januvia 25mg qd    LABS  CBC (10-17 @ 10:45)                          8.5<L>                   7.2     )--------------(  144<L>     79.5<H>% Neuts, 11.2<L>% Lymphs, ANC: 5.7                             25.8<L>    BMP (10-17 @ 10:45)       138     |  104     |  14    			Ca++ --      Ca 8.0<L>       ---------------------------------( 176<H>		Mg 2.1          3.7     |  26      |  1.11  			Ph --        LFTs (10-17 @ 10:45)      TPro 6.4 / Alb 3.1<L> / TBili 1.1 / DBili -- / AST 66<H> / ALT 83<H> / AlkPhos 65    Coags (10-17 @ 10:45)  aPTT 27.6 / INR 1.20<H> / PT 13.1<H>    Cardiac Markers (10-17 @ 10:45)     Trop: -- <0.015 / CKMB: -- / CK: 314 / BNP 5086    ABG (10-17 @ 10:45)      /  /  /  /  / %     Lactate:  1.2    Urinalysis (10-17 @ 10:59):     Color: Yellow / Appearance: Clear / S.010 / pH: 6.0 / Gluc: Negative / Ketones: Negative / Bili: Negative / Urobili: 1<!> / Protein :100<!> / Nitrites: Negative / Leuk.Est: Trace<!> / RBC: 2-5<!> / WBC: 0-2 / Sq Epi:  / Non Sq Epi: Many<!> / Bacteria Trace<!>   Occasional Renal Tubular Epithelial    IMAGING  EXAM:  CT BRAIN                        PROCEDURE DATE:  10/17/2018    FINDINGS:  There is mild diffuse parenchymal volume loss. There are areas of low   attenuation in the periventricular white matter likely related to mild   chronic microvascular ischemic changes.  Chronic lacunar infarcts right basal ganglia.  There is no acute intracranial hemorrhage, parenchymal mass, mass effect   or midline shift. There is no acute territorial infarct. There is no   hydrocephalus. Partial empty sella  The cranium is intact.     The visualized paranasal sinuses are   well-aerated.  IMPRESSION:  No acute intracranial hemorrhage    EXAM:  CT ABDOMEN AND PELVIS IC                        EXAM:  CT CHEST IC                        FINDINGS:  The visualized structures of the lower neck are unremarkable.   The visualized aorta is of normal course and caliber. The heart is not   enlarged. There is a minimal pericardial effusion. There is subcutaneous   emphysema in the left anterior chest wall and left anterior abdominal   wall with air bubbles seen within the left abdominal wall musculature.  There is no evidence of axillary, hilar, or mediastinal lymphadenopathy.  No focal lung consolidations identified. No evidence ofpneumothorax. No   pulmonary nodules identified. There are small bilateral pleural   effusions. There is mild linear atelectasis at the lung bases.  The liver is of normal contour. There is a coarse calcification in the   lateral segment of the left hepatic lobe. There is no evidence of mass.   The gallbladder is absent. There is an intermediate density fluid   collection in the gallbladder fossa suggesting hematoma. There are also   small air bubbles within the gallbladder fossa. Findings may be   postsurgical or posttraumatic or represent a combination there of. The   hematoma measures approximately 8.4 x 5.7 cm on axial image 115 of series   2. Additional hematoma extends superiorly.. No evidence of intra or   extrahepatic biliary dilatation.   The pancreas, spleen, adrenal glands, and kidneys are grossly   unremarkable. There are small bilateral renal hypodensities rounded in   contour suggestive of small cysts. There is no evidence of hydronephrosis   or perinephric stranding. No evidence of  nephrolithiasis.The bladder is   remarkable for the presence of air. Please correlate for any recent   instrumentation..  No evidence of lymphadenopathy utilizing CT size criteria. The aorta is   not aneurysmal.  There is no evidence of bowel obstruction. There is no evidence of   pneumoperitoneum or free fluid.  The visualized osseous structures are remarkable for degenerative   changes. Additionally there are multiple right rib fractures involving   the fourth fifth and sixth lateral ribs. There is soft tissue stranding   the level of the subcutaneous soft tissues of the coccyx. There is soft   tissue stranding in the anterior pelvic wall and along the flanks.    IMPRESSION:  Fractures of the right fourth fifth and sixth lateral ribs   without evidence of pneumothorax  Small bilateral pleural effusions  Subcutaneous and intramuscular air in the left anterolateral chest and   abdominal wall.  This may be secondary to recent surgery  Small amount of air in the bladderwithout any other evidence for pelvic   trauma may be secondary to recent Andrade catheter placement.  Results were discussed Dr. Princess Fitzpatrick on 10/17/2018 at 2:50 PM.

## 2018-10-17 NOTE — H&P ADULT - ASSESSMENT
Patient is a 83y old female with A. fib (not on anticoagulation), HTN, HLD, and DM  s/p robotic cholecystectomy and liver resection (10/11) presented as a transfer s/p standing from fall with right 4 to 6 lateral rib fracture with bilateral pleural effusion.       PLAN:  - No acute trauma surgical intervention indicated at the time   - Multimodal pain control with Tylenol, Motrin and lidocaine patch, judicious use of narcotics and sedation medication given history of hallucination   - Cardiac workup with EKG, troponin, echocardiogram and cardiac consult   - Encourage incentive spirometry use while awake  - PT consult and encourage ambulation   - Plan to be discussed with Attending, Dr. Perea Patient is a 83y old female with A. fib (not on anticoagulation), HTN, HLD, and DM  s/p robotic cholecystectomy and liver resection (10/11) presented as a transfer s/p standing from fall with right 4 to 6 lateral rib fracture with bilateral pleural effusion. Patient is hemodynamically stable and able to pull 1100 on incentive spirometry consistently. OSH workup showed elevated BNP at > 5000     PLAN:  - No acute trauma surgical intervention indicated at the time   - Multimodal pain control with Tylenol, Motrin and lidocaine patch, judicious use of narcotics and sedation medication given history of hallucination   - Cardiac workup with EKG, troponin, echocardiogram and cardiac consult   - Encourage incentive spirometry use while awake  - PT consult and encourage ambulation   - Plan to be discussed with Attending, Dr. Perea

## 2018-10-17 NOTE — H&P ADULT - NSHPLABSRESULTS_GEN_ALL_CORE
CBC (10-17 @ 10:45)                          8.5<L>                   7.2     )--------------(  144<L>     79.5<H>% Neuts, 11.2<L>% Lymphs, ANC: 5.7                             25.8<L>    BMP (10-17 @ 10:45)       138     |  104     |  14    			Ca++ --      Ca 8.0<L>       ---------------------------------( 176<H>		Mg 2.1          3.7     |  26      |  1.11  			Ph --        LFTs (10-17 @ 10:45)      TPro 6.4 / Alb 3.1<L> / TBili 1.1 / DBili -- / AST 66<H> / ALT 83<H> / AlkPhos 65    Coags (10-17 @ 10:45)  aPTT 27.6 / INR 1.20<H> / PT 13.1<H>    Cardiac Markers (10-17 @ 10:45)     Trop: -- <0.015 / CKMB: -- / CK: 314 / BNP 5086    ABG (10-17 @ 10:45)      /  /  /  /  / %     Lactate:  1.2    Urinalysis (10-17 @ 10:59):     Color: Yellow / Appearance: Clear / S.010 / pH: 6.0 / Gluc: Negative / Ketones: Negative / Bili: Negative / Urobili: 1<!> / Protein :100<!> / Nitrites: Negative / Leuk.Est: Trace<!> / RBC: 2-5<!> / WBC: 0-2 / Sq Epi:  / Non Sq Epi: Many<!> / Bacteria Trace<!>   Occasional Renal Tubular Epithelial    EXAM:  CT BRAIN                        PROCEDURE DATE:  10/17/2018    IMPRESSION: No acute intracranial hemorrhage    EXAM:  CT ABDOMEN AND PELVIS IC                        EXAM:  CT CHEST IC  IMPRESSION:  Fractures of the right fourth fifth and sixth lateral ribs   without evidence of pneumothorax  Small bilateral pleural effusions  Subcutaneous and intramuscular air in the left anterolateral chest and   abdominal wall.  This may be secondary to recent surgery  Small amount of air in the bladder without any other evidence for pelvic   trauma may be secondary to recent Andrade catheter placement.

## 2018-10-17 NOTE — ED PROVIDER NOTE - CARE PLAN
Principal Discharge DX:	Ribs, multiple fractures  Secondary Diagnosis:	Abdominal hematoma  Secondary Diagnosis:	Anemia

## 2018-10-17 NOTE — ED ADULT NURSE NOTE - NSIMPLEMENTINTERV_GEN_ALL_ED
Implemented All Fall with Harm Risk Interventions:  [place] to call system. Call bell, personal items and telephone within reach. Instruct patient to call for assistance. Room bathroom lighting operational. Non-slip footwear when patient is off stretcher. Physically safe environment: no spills, clutter or unnecessary equipment. Stretcher in lowest position, wheels locked, appropriate side rails in place. Provide visual cue, wrist band, yellow gown, etc. Monitor gait and stability. Monitor for mental status changes and reorient to person, place, and time. Review medications for side effects contributing to fall risk. Reinforce activity limits and safety measures with patient and family. Provide visual clues: red socks.

## 2018-10-17 NOTE — ED ADULT NURSE NOTE - CHIEF COMPLAINT QUOTE
rib fractures trauma transfer from [facility].   fall , b/l pleural effusions with abdominal hematoma

## 2018-10-17 NOTE — ED ADULT TRIAGE NOTE - CHIEF COMPLAINT QUOTE
rib fractures trauma transfer from [facility].   fall , b/l pleural effusions with abdominal hematoma

## 2018-10-17 NOTE — ED PROVIDER NOTE - MUSCULOSKELETAL, MLM
Spine appears normal, range of motion is not limited, no muscle or joint tenderness, no CVAT, no neck tenderness

## 2018-10-17 NOTE — ED PROVIDER NOTE - OBJECTIVE STATEMENT
83 F w HTN, DM, Afib not taking coumadin s/p lap karoline on 10/11/18 s/p fall in the bathroom with right sided torso injury. She was sent from [facility] for trauma evaluation for CT findings consistent with right lateral 4th, 5th and 6th rib fractures. She has a large hematoma sat the site as well. She was reportedly more responsive prior to being given morphine at [facility] for pain. 83 F w HTN, DM, Afib not taking coumadin s/p lap karoline on 10/11/18 s/p fall in the bathroom with right sided torso injury. She was sent from [facility] for trauma evaluation for CT findings consistent with right lateral 4th, 5th and 6th rib fractures. She has a large hematoma sat the site as well. She was reportedly more responsive prior to being given morphine at [facility] for pain.    Attendinyo female presents s/p fall.  pt fell on the right side and had large hematoma.  pt has 3 rib fractures.  of note, pt had lap robotic cholecystectomy about 6 days back.

## 2018-10-17 NOTE — ED PROVIDER NOTE - MUSCULOSKELETAL, MLM
Spine appears normal, range of motion is not limited, no muscle or joint tenderness. + right lateral upper arm ecchymosis

## 2018-10-17 NOTE — ED PROVIDER NOTE - PHYSICAL EXAMINATION
lap karoline incisions appear clean and healing well with aging ecchymosis  + large area of right lateral thoracic ecchymosis tracking downward

## 2018-10-17 NOTE — ED ADULT TRIAGE NOTE - CHIEF COMPLAINT QUOTE
Lethargic post fall x 2 since 1 am Lethargic post fall x 2 since 1 am.s/p partial resection of liver and cholecystectomy on 10/11/2018,d/padmaja from [facility] yesterday

## 2018-10-18 DIAGNOSIS — I10 ESSENTIAL (PRIMARY) HYPERTENSION: ICD-10-CM
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
## 2018-10-18 DIAGNOSIS — J90 PLEURAL EFFUSION, NOT ELSEWHERE CLASSIFIED: ICD-10-CM

## 2018-10-18 DIAGNOSIS — I48.91 UNSPECIFIED ATRIAL FIBRILLATION: ICD-10-CM

## 2018-10-18 DIAGNOSIS — Z79.899 OTHER LONG TERM (CURRENT) DRUG THERAPY: ICD-10-CM

## 2018-10-18 DIAGNOSIS — E11.9 TYPE 2 DIABETES MELLITUS WITHOUT COMPLICATIONS: ICD-10-CM

## 2018-10-18 DIAGNOSIS — S22.39XA FRACTURE OF ONE RIB, UNSPECIFIED SIDE, INITIAL ENCOUNTER FOR CLOSED FRACTURE: ICD-10-CM

## 2018-10-18 DIAGNOSIS — Z29.9 ENCOUNTER FOR PROPHYLACTIC MEASURES, UNSPECIFIED: ICD-10-CM

## 2018-10-18 LAB
ANION GAP SERPL CALC-SCNC: 12 MMOL/L — SIGNIFICANT CHANGE UP (ref 5–17)
BUN SERPL-MCNC: 8 MG/DL — SIGNIFICANT CHANGE UP (ref 7–23)
CALCIUM SERPL-MCNC: 7.9 MG/DL — LOW (ref 8.4–10.5)
CHLORIDE SERPL-SCNC: 104 MMOL/L — SIGNIFICANT CHANGE UP (ref 96–108)
CO2 SERPL-SCNC: 24 MMOL/L — SIGNIFICANT CHANGE UP (ref 22–31)
CREAT SERPL-MCNC: 0.89 MG/DL — SIGNIFICANT CHANGE UP (ref 0.5–1.3)
CULTURE RESULTS: SIGNIFICANT CHANGE UP
GLUCOSE SERPL-MCNC: 134 MG/DL — HIGH (ref 70–99)
HBA1C BLD-MCNC: 6.2 % — HIGH (ref 4–5.6)
HCT VFR BLD CALC: 26.2 % — LOW (ref 34.5–45)
HGB BLD-MCNC: 8.8 G/DL — LOW (ref 11.5–15.5)
MCHC RBC-ENTMCNC: 32.2 PG — SIGNIFICANT CHANGE UP (ref 27–34)
MCHC RBC-ENTMCNC: 33.6 GM/DL — SIGNIFICANT CHANGE UP (ref 32–36)
MCV RBC AUTO: 96 FL — SIGNIFICANT CHANGE UP (ref 80–100)
PLATELET # BLD AUTO: 150 K/UL — SIGNIFICANT CHANGE UP (ref 150–400)
POTASSIUM SERPL-MCNC: 3.8 MMOL/L — SIGNIFICANT CHANGE UP (ref 3.5–5.3)
POTASSIUM SERPL-SCNC: 3.8 MMOL/L — SIGNIFICANT CHANGE UP (ref 3.5–5.3)
RBC # BLD: 2.73 M/UL — LOW (ref 3.8–5.2)
RBC # FLD: 15.8 % — HIGH (ref 10.3–14.5)
SODIUM SERPL-SCNC: 140 MMOL/L — SIGNIFICANT CHANGE UP (ref 135–145)
SPECIMEN SOURCE: SIGNIFICANT CHANGE UP
WBC # BLD: 5.75 K/UL — SIGNIFICANT CHANGE UP (ref 3.8–10.5)
WBC # FLD AUTO: 5.75 K/UL — SIGNIFICANT CHANGE UP (ref 3.8–10.5)

## 2018-10-18 PROCEDURE — 93306 TTE W/DOPPLER COMPLETE: CPT | Mod: 26

## 2018-10-18 PROCEDURE — 99223 1ST HOSP IP/OBS HIGH 75: CPT

## 2018-10-18 PROCEDURE — 71045 X-RAY EXAM CHEST 1 VIEW: CPT | Mod: 26

## 2018-10-18 RX ORDER — AMLODIPINE BESYLATE 2.5 MG/1
1 TABLET ORAL
Qty: 0 | Refills: 0 | COMMUNITY

## 2018-10-18 RX ORDER — ACETAMINOPHEN 500 MG
975 TABLET ORAL EVERY 6 HOURS
Qty: 0 | Refills: 0 | Status: DISCONTINUED | OUTPATIENT
Start: 2018-10-18 | End: 2018-10-19

## 2018-10-18 RX ORDER — INFLUENZA VIRUS VACCINE 15; 15; 15; 15 UG/.5ML; UG/.5ML; UG/.5ML; UG/.5ML
0.5 SUSPENSION INTRAMUSCULAR ONCE
Qty: 0 | Refills: 0 | Status: DISCONTINUED | OUTPATIENT
Start: 2018-10-18 | End: 2018-10-19

## 2018-10-18 RX ORDER — ATORVASTATIN CALCIUM 80 MG/1
10 TABLET, FILM COATED ORAL AT BEDTIME
Qty: 0 | Refills: 0 | Status: DISCONTINUED | OUTPATIENT
Start: 2018-10-18 | End: 2018-10-19

## 2018-10-18 RX ORDER — ATORVASTATIN CALCIUM 80 MG/1
1 TABLET, FILM COATED ORAL
Qty: 0 | Refills: 0 | COMMUNITY

## 2018-10-18 RX ORDER — SITAGLIPTIN 50 MG/1
1 TABLET, FILM COATED ORAL
Qty: 0 | Refills: 0 | COMMUNITY

## 2018-10-18 RX ORDER — GLIMEPIRIDE 1 MG
1 TABLET ORAL
Qty: 0 | Refills: 0 | COMMUNITY

## 2018-10-18 RX ORDER — ISOSORBIDE DINITRATE 5 MG/1
1 TABLET ORAL
Qty: 0 | Refills: 0 | COMMUNITY

## 2018-10-18 RX ADMIN — TRAMADOL HYDROCHLORIDE 25 MILLIGRAM(S): 50 TABLET ORAL at 22:53

## 2018-10-18 RX ADMIN — TRAMADOL HYDROCHLORIDE 25 MILLIGRAM(S): 50 TABLET ORAL at 16:15

## 2018-10-18 RX ADMIN — TRAMADOL HYDROCHLORIDE 25 MILLIGRAM(S): 50 TABLET ORAL at 11:36

## 2018-10-18 RX ADMIN — ISOSORBIDE DINITRATE 5 MILLIGRAM(S): 5 TABLET ORAL at 18:30

## 2018-10-18 RX ADMIN — LIDOCAINE 1 PATCH: 4 CREAM TOPICAL at 22:59

## 2018-10-18 RX ADMIN — TRAMADOL HYDROCHLORIDE 25 MILLIGRAM(S): 50 TABLET ORAL at 12:02

## 2018-10-18 RX ADMIN — Medication 1: at 13:50

## 2018-10-18 RX ADMIN — ATORVASTATIN CALCIUM 10 MILLIGRAM(S): 80 TABLET, FILM COATED ORAL at 22:53

## 2018-10-18 RX ADMIN — Medication 20 MILLIGRAM(S): at 05:51

## 2018-10-18 RX ADMIN — Medication 650 MILLIGRAM(S): at 00:57

## 2018-10-18 RX ADMIN — LIDOCAINE 1 PATCH: 4 CREAM TOPICAL at 17:14

## 2018-10-18 RX ADMIN — ISOSORBIDE DINITRATE 5 MILLIGRAM(S): 5 TABLET ORAL at 05:50

## 2018-10-18 RX ADMIN — AMLODIPINE BESYLATE 5 MILLIGRAM(S): 2.5 TABLET ORAL at 05:51

## 2018-10-18 RX ADMIN — Medication 650 MILLIGRAM(S): at 05:50

## 2018-10-18 RX ADMIN — Medication 650 MILLIGRAM(S): at 00:27

## 2018-10-18 RX ADMIN — ENOXAPARIN SODIUM 40 MILLIGRAM(S): 100 INJECTION SUBCUTANEOUS at 11:37

## 2018-10-18 RX ADMIN — Medication 650 MILLIGRAM(S): at 11:38

## 2018-10-18 RX ADMIN — TRAMADOL HYDROCHLORIDE 25 MILLIGRAM(S): 50 TABLET ORAL at 23:23

## 2018-10-18 RX ADMIN — Medication 975 MILLIGRAM(S): at 13:51

## 2018-10-18 RX ADMIN — Medication 975 MILLIGRAM(S): at 18:30

## 2018-10-18 RX ADMIN — TRAMADOL HYDROCHLORIDE 25 MILLIGRAM(S): 50 TABLET ORAL at 16:45

## 2018-10-18 RX ADMIN — Medication 650 MILLIGRAM(S): at 06:20

## 2018-10-18 RX ADMIN — Medication 650 MILLIGRAM(S): at 12:10

## 2018-10-18 RX ADMIN — LIDOCAINE 1 PATCH: 4 CREAM TOPICAL at 11:37

## 2018-10-18 NOTE — PHYSICAL THERAPY INITIAL EVALUATION ADULT - ADDITIONAL COMMENTS
Pt lives in a private home with daughter and spouse. Prior to admission pt was independent with all functional mobility and used a RW at times to ambulate.

## 2018-10-18 NOTE — CHART NOTE - NSCHARTNOTEFT_GEN_A_CORE
Discussed patients current admission with her cardiologist Dr. Kodi Saunders [phone]. Per Dr. Saunders, the patients elevated BNP and tricuspid regurgitation on TTE are known findings. Discussed the need for further cardiac workup. Per Dr. Saunders, the patient is cleared to go home and may follow up with him in the office as an outpatient for further evaluation/workup of b/l pleural effusions on CT chest.     Yg Whitley  PGY-1  General Surgery, [employer]    #[id number] Discussed patients current admission with her cardiologist Dr. Kodi Saunders [phone]. Per Dr. Saunders, the patients elevated BNP and tricuspid regurgitation on TTE are known findings. Discussed the need for further cardiac workup. Per Dr. Saunders, the patient is cleared to be discharged and may follow up with him in the office as an outpatient for further evaluation/workup of b/l pleural effusions on CT chest.     Yg Whitley  PGY-1  General Surgery, [employer]    #[id number]

## 2018-10-18 NOTE — PHYSICAL THERAPY INITIAL EVALUATION ADULT - GENERAL OBSERVATIONS, REHAB EVAL
pt received semi-supine in bed +IV lock,  phone used throughout session as pt primarily Mandarin speaking

## 2018-10-18 NOTE — CONSULT NOTE ADULT - PROBLEM SELECTOR RECOMMENDATION 2
2/2 adverse effect of medication. pt took unclear amount of ambien and was experiencing auditory/visual hallucinations that likley lead to confusion and her fall. lower suspicion for cardiogenic syncope at this time.   -PT consult  -avoid ambien and caution with other narcotics, sedatives 2/2 adverse effect of medication. pt took unclear amount of ambien and was experiencing auditory/visual hallucinations that likley lead to confusion and her fall. lower suspicion for cardiogenic syncope at this time.   -PT consult  -avoid ambien and caution with other narcotics, sedatives  -can consider checking vit d level, starting vit d for secondary fall prevention

## 2018-10-18 NOTE — PROGRESS NOTE ADULT - ASSESSMENT
Patient is a 83y old female with A. fib (not on anticoagulation), HTN, HLD, and DM  s/p robotic cholecystectomy and liver resection (10/11) presented as a transfer s/p standing from fall with right 4 to 6 lateral rib fracture with bilateral pleural effusion. Patient is hemodynamically stable and able to pull 1100 on incentive spirometry consistently. OSH workup showed elevated BNP at > 5000     PLAN:  - Multimodal pain control with Tylenol, Motrin and lidocaine patch, judicious use of narcotics and sedation medication given history of hallucination   - Cardiac workup with EKG, troponin, echocardiogram and cardiac consult   - Encourage incentive spirometry use while awake  - PT consult and encourage ambulation

## 2018-10-18 NOTE — CONSULT NOTE ADULT - PROBLEM SELECTOR RECOMMENDATION 3
pt found to have pleural effusions and elevated BNP at outside hosptial  -TTE here with moderate pulm pressures, enlarged RV, tricuspid regurgitation concerning for pulmonary HTN. pt should follow up with her cardiologist to further assess these findings  -CXR with clear lungs  -pt with normal O2 saturation/breathing comfortably on room air. No need for diuresis at this time

## 2018-10-18 NOTE — CONSULT NOTE ADULT - PROBLEM SELECTOR RECOMMENDATION 4
rate controlled. Not on AC. chadvasc of 5, high risk patient.  -will try and obtain collateral as to why pt not on AC.  -no need for rate control medication at this time.

## 2018-10-18 NOTE — CONSULT NOTE ADULT - PROBLEM SELECTOR RECOMMENDATION 5
on januvia, glimeperide at home  -monitor FS and low dose sliding scale as needed  -lipitor 10mg daily

## 2018-10-18 NOTE — PHYSICAL THERAPY INITIAL EVALUATION ADULT - PLANNED THERAPY INTERVENTIONS, PT EVAL
balance training/bed mobility training/strengthening/transfer training/gait training/GOAL: Pt will independently negotiate stairs with 1 handrail with step to pattern in 2wks.

## 2018-10-18 NOTE — PROGRESS NOTE ADULT - SUBJECTIVE AND OBJECTIVE BOX
GENERAL SURGERY DAILY PROGRESS NOTE:       Subjective:  Pt seen and examined at bedside Pain controlled. Tolerating regular diet. No acute events overnight.         Objective:    PE:  Gen: NAD  Resp: airway patent, respirations unlabored, no increased WoB. + R chest wall TTP   CVS: RRR  Abd: soft, ND, NT, no rebound or guarding  Ext: no edema, WWP  Neuro: AAOx3, no focal deficits    Vital Signs Last 24 Hrs  T(C): 36.9 (18 Oct 2018 05:03), Max: 37.2 (17 Oct 2018 23:43)  T(F): 98.4 (18 Oct 2018 05:03), Max: 98.9 (17 Oct 2018 23:43)  HR: 60 (18 Oct 2018 05:03) (60 - 80)  BP: 162/72 (18 Oct 2018 05:03) (134/63 - 172/91)  BP(mean): --  RR: 18 (18 Oct 2018 05:03) (16 - 20)  SpO2: 100% (18 Oct 2018 05:03) (97% - 100%)    I&O's Detail      Daily Height in cm: 162.56 (17 Oct 2018 18:15)    Daily     MEDICATIONS  (STANDING):  acetaminophen   Tablet .. 650 milliGRAM(s) Oral every 6 hours  amLODIPine   Tablet 5 milliGRAM(s) Oral daily  dextrose 5%. 1000 milliLiter(s) (50 mL/Hr) IV Continuous <Continuous>  dextrose 50% Injectable 12.5 Gram(s) IV Push once  dextrose 50% Injectable 25 Gram(s) IV Push once  dextrose 50% Injectable 25 Gram(s) IV Push once  enalapril 20 milliGRAM(s) Oral daily  enoxaparin Injectable 40 milliGRAM(s) SubCutaneous daily  influenza   Vaccine 0.5 milliLiter(s) IntraMuscular once  insulin lispro (HumaLOG) corrective regimen sliding scale   SubCutaneous three times a day before meals  insulin lispro (HumaLOG) corrective regimen sliding scale   SubCutaneous at bedtime  isosorbide   dinitrate Tablet (ISORDIL) 5 milliGRAM(s) Oral two times a day  lidocaine   Patch 1 Patch Transdermal daily  traMADol 25 milliGRAM(s) Oral every 6 hours    MEDICATIONS  (PRN):  dextrose 40% Gel 15 Gram(s) Oral once PRN Blood Glucose LESS THAN 70 milliGRAM(s)/deciliter  glucagon  Injectable 1 milliGRAM(s) IntraMuscular once PRN Glucose LESS THAN 70 milligrams/deciliter  oxyCODONE    IR 5 milliGRAM(s) Oral every 4 hours PRN break through      LABS:                        8.8    5.75  )-----------( 150      ( 18 Oct 2018 07:09 )             26.2     10-18    140  |  104  |  8   ----------------------------<  134<H>  3.8   |  24  |  0.89    Ca    7.9<L>      18 Oct 2018 05:57            RADIOLOGY & ADDITIONAL STUDIES: GENERAL SURGERY DAILY PROGRESS NOTE:       Subjective:  Pt seen and examined at bedside Pain controlled. Tolerating regular diet. No acute events overnight.         Objective:    PE:  Gen: NAD  Resp: airway patent, respirations unlabored, no increased WoB. + R chest wall TTP   CVS: RRR  Abd: soft, ND, NT, no rebound or guarding. slight ecchymosis around port site which has crusted dermabond, there is a moderate sized ecchymosis extending just below the costal margin to the right anterior superior iliac crest, which has been there since surgery prior to fall.   Ext: no edema, WWP  Neuro: AAOx3, no focal deficits    Vital Signs Last 24 Hrs  T(C): 36.9 (18 Oct 2018 05:03), Max: 37.2 (17 Oct 2018 23:43)  T(F): 98.4 (18 Oct 2018 05:03), Max: 98.9 (17 Oct 2018 23:43)  HR: 60 (18 Oct 2018 05:03) (60 - 80)  BP: 162/72 (18 Oct 2018 05:03) (134/63 - 172/91)  BP(mean): --  RR: 18 (18 Oct 2018 05:03) (16 - 20)  SpO2: 100% (18 Oct 2018 05:03) (97% - 100%)    I&O's Detail      Daily Height in cm: 162.56 (17 Oct 2018 18:15)    Daily     MEDICATIONS  (STANDING):  acetaminophen   Tablet .. 650 milliGRAM(s) Oral every 6 hours  amLODIPine   Tablet 5 milliGRAM(s) Oral daily  dextrose 5%. 1000 milliLiter(s) (50 mL/Hr) IV Continuous <Continuous>  dextrose 50% Injectable 12.5 Gram(s) IV Push once  dextrose 50% Injectable 25 Gram(s) IV Push once  dextrose 50% Injectable 25 Gram(s) IV Push once  enalapril 20 milliGRAM(s) Oral daily  enoxaparin Injectable 40 milliGRAM(s) SubCutaneous daily  influenza   Vaccine 0.5 milliLiter(s) IntraMuscular once  insulin lispro (HumaLOG) corrective regimen sliding scale   SubCutaneous three times a day before meals  insulin lispro (HumaLOG) corrective regimen sliding scale   SubCutaneous at bedtime  isosorbide   dinitrate Tablet (ISORDIL) 5 milliGRAM(s) Oral two times a day  lidocaine   Patch 1 Patch Transdermal daily  traMADol 25 milliGRAM(s) Oral every 6 hours    MEDICATIONS  (PRN):  dextrose 40% Gel 15 Gram(s) Oral once PRN Blood Glucose LESS THAN 70 milliGRAM(s)/deciliter  glucagon  Injectable 1 milliGRAM(s) IntraMuscular once PRN Glucose LESS THAN 70 milligrams/deciliter  oxyCODONE    IR 5 milliGRAM(s) Oral every 4 hours PRN break through      LABS:                        8.8    5.75  )-----------( 150      ( 18 Oct 2018 07:09 )             26.2     10-18    140  |  104  |  8   ----------------------------<  134<H>  3.8   |  24  |  0.89    Ca    7.9<L>      18 Oct 2018 05:57            RADIOLOGY & ADDITIONAL STUDIES:

## 2018-10-18 NOTE — CONSULT NOTE ADULT - ASSESSMENT
82 yo Mandarin speaking female with A. fib (not on anticoagulation), HTN, HLD, and DM2, gall bladder carcinoma, came to the ER s/p fall resulting in Right 4-6 lateral rib fractures

## 2018-10-18 NOTE — CONSULT NOTE ADULT - PROBLEM SELECTOR RECOMMENDATION 6
stable.   -continue norvasc 5mg and enalapril 20mg daily stable.   -continue norvasc 5mg and enalapril 20mg daily  -continue isordil 5mg bid

## 2018-10-18 NOTE — CONSULT NOTE ADULT - SUBJECTIVE AND OBJECTIVE BOX
TRAUMA/ACUTE CARE SURGERY - [facility] MEDICINE CO-MANAGEMENT INITIAL VISIT NOTE  Contact Number:[id number]    ISRRAEL HDEZ  [phone]    CHIEF COMPLAINT: Patient is a 83y old  Female who presents with a chief complaint of Trauma s/p fall from standing (18 Oct 2018 10:01)      HPI: TRAUMA ACTIVATION LEVEL: consult    MECHANISM OF INJURY: fall from standing        HISTORY OBTAINED VIA [employer] PHONE INTEPRETER.    HPI:  84 yo Mandarin speaking female with A. fib (not on anticoagulation), HTN, HLD, and DM2, gall bladder carcinoma, came to the ER s/p fall. Patient had a recent robotic cholecystectomy and liver resection for gallbladder tumor at [facility] about 6 days ago. Patient was having trouble sleeping the night prior to admission and took 3 tablets of ambien (unknown dosage) she brought from [place]. Patient started having vivid visual and auditory hallucinations after taking the medication for the rest of the evening and didn't get much sleep. This morning when standing up from toilet, she lost her balance and fell to her right side and hit her right flank on the bathtub on the way down. She denies any lightheadedness or dizziness before the fall. She denies head struck or loss of consciousness. She denies having similar events occur in past. Her daughter came into the bathroom after heard the noise and helped her up. Patient was able to ambulate into the kitchen with assistance and had breakfast. Patient was later brought to the [facility] due to persistent flank pain. Patient denies pain anywhere else. Of note, patient was sent home with rolling walker and PO morphine after her surgery but patient prefers to walk on her own and only took Tylenol because of nausea from narcotics. Patient is able to pull 1100 on incentive spirometry consistently.     While at [facility] pt was found to have bilateral pleural effusions and elevated pro-BNP to 5000. TTE ordered here for further evaluation.    pt seen and examined with RN at bedside. She is having significant pain /distress on R. flank- has not received any pain medication today. Denies SOB, other chest pain, palpitations, prior cardiac problems.     Pain Symptoms if applicable:             	                         none	   mild         moderate         severe  	                            0	    1-3	     4-6	         7-10  Pain: moderate to severe  Location:	R flank  Modifying factors: worse with movement, deep inspiration.    Allergies    No Known Allergies          acetaminophen   Tablet .. 975 milliGRAM(s) Oral every 6 hours  amLODIPine   Tablet 5 milliGRAM(s) Oral daily  dextrose 40% Gel 15 Gram(s) Oral once PRN  dextrose 5%. 1000 milliLiter(s) IV Continuous <Continuous>  dextrose 50% Injectable 12.5 Gram(s) IV Push once  dextrose 50% Injectable 25 Gram(s) IV Push once  dextrose 50% Injectable 25 Gram(s) IV Push once  enalapril 20 milliGRAM(s) Oral daily  enoxaparin Injectable 40 milliGRAM(s) SubCutaneous daily  glucagon  Injectable 1 milliGRAM(s) IntraMuscular once PRN  influenza   Vaccine 0.5 milliLiter(s) IntraMuscular once  insulin lispro (HumaLOG) corrective regimen sliding scale   SubCutaneous three times a day before meals  insulin lispro (HumaLOG) corrective regimen sliding scale   SubCutaneous at bedtime  isosorbide   dinitrate Tablet (ISORDIL) 5 milliGRAM(s) Oral two times a day  lidocaine   Patch 1 Patch Transdermal daily  oxyCODONE    IR 5 milliGRAM(s) Oral every 4 hours PRN  traMADol 25 milliGRAM(s) Oral every 6 hours      MEDICATIONS  (STANDING):  acetaminophen   Tablet .. 975 milliGRAM(s) Oral every 6 hours  amLODIPine   Tablet 5 milliGRAM(s) Oral daily  dextrose 5%. 1000 milliLiter(s) (50 mL/Hr) IV Continuous <Continuous>  dextrose 50% Injectable 12.5 Gram(s) IV Push once  dextrose 50% Injectable 25 Gram(s) IV Push once  dextrose 50% Injectable 25 Gram(s) IV Push once  enalapril 20 milliGRAM(s) Oral daily  enoxaparin Injectable 40 milliGRAM(s) SubCutaneous daily  influenza   Vaccine 0.5 milliLiter(s) IntraMuscular once  insulin lispro (HumaLOG) corrective regimen sliding scale   SubCutaneous three times a day before meals  insulin lispro (HumaLOG) corrective regimen sliding scale   SubCutaneous at bedtime  isosorbide   dinitrate Tablet (ISORDIL) 5 milliGRAM(s) Oral two times a day  lidocaine   Patch 1 Patch Transdermal daily  traMADol 25 milliGRAM(s) Oral every 6 hours    MEDICATIONS  (PRN):  dextrose 40% Gel 15 Gram(s) Oral once PRN Blood Glucose LESS THAN 70 milliGRAM(s)/deciliter  glucagon  Injectable 1 milliGRAM(s) IntraMuscular once PRN Glucose LESS THAN 70 milligrams/deciliter  oxyCODONE    IR 5 milliGRAM(s) Oral every 4 hours PRN break through      PAST MEDICAL & SURGICAL HISTORY:  Afib- unclear why not on AC  DM (diabetes mellitus)  HTN (hypertension)  Carcinoma gallbladder: s/p robotic cholecystectomy and liver resection  [x ] Reviewed     Functional Assessment: [ ] Independent  [x ] Assistance  [ ] Total care  [ ] Non-ambulatory  uses cane for assistance, lives with family    SOCIAL HISTORY:  Residence: [ ] care home  [ ] SNF  [ x] Community  [ ] Substance abuse: denies  [ ] Tobacco: denies  [ ] Alcohol use:     FAMILY HISTORY:  No pertinent family history in first degree relatives  [ x] No pertinent family history in first degree relatives     REVIEW OF SYSTEMS    CONSTITUTIONAL: No fever, weight loss, or fatigue  EYES: No eye pain, visual disturbances, or discharge  ENMT:  No difficulty hearing, tinnitus, vertigo; No sinus or throat pain  NECK: No pain or stiffness  RESPIRATORY: No cough, wheezing, chills or hemoptysis; No shortness of breath  CARDIOVASCULAR: No chest pain, palpitations, dizziness, or leg swelling  GASTROINTESTINAL: No abdominal or epigastric pain. No nausea, vomiting, or hematemesis; No diarrhea or constipation. No melena or hematochezia.  GENITOURINARY: No dysuria, frequency, hematuria, or incontinence  NEUROLOGICAL: No headaches, memory loss, loss of strength, numbness, or tremors  SKIN: No itching, burning, rashes, or lesions   MUSCULOSKELETAL: + RIGHT back and chest pain with dec ROM 2/2 pain  PSYCHIATRIC: No depression, anxiety, mood swings, or difficulty sleeping  HEME/LYMPH: + bruising from fall      PHYSICAL EXAM:    T(C): 36.4 (10-18-18 @ 13:40), Max: 37.2 (10-17-18 @ 23:43)  HR: 64 (10-18-18 @ 14:32) (60 - 80)  BP: 145/80 (10-18-18 @ 14:32) (134/63 - 172/91)  RR: 18 (10-18-18 @ 13:40) (16 - 20)  SpO2: 94% (10-18-18 @ 14:32) (94% - 100%)    GENERAL: elderly Chinese female, appears in distress 2/2 pain  HEAD:  Atraumatic, Normocephalic  EYES: EOMI, PERRLA, conjunctiva and sclera clear  ENMT: Moist mucous membranes, poor dentition   NECK: Supple, No JVD  RESPIRATORY: difficult exam 2/2 pain. dec BS at bases.  CARDIOVASCULAR: irregularly irregular, no murmurs appreciated  right chest wall is tender to touch, diffuse ecchymoses, limited ROM 2/2 pain  GASTROINTESTINAL: Soft, Nontender, Nondistended; Bowel sounds present  GENITOURINARY: + external urinary catheter device  EXTREMITIES:  2+ Peripheral Pulses, No clubbing, cyanosis, or edema  NERVOUS SYSTEM:  Alert & Oriented X3; Moving all 4 extremities; No gross sensory deficits  SKIN: diffuse Right chest wall ecchymoses extending to anterior suprapubic region    LABS:                        8.8    5.75  )-----------( 150      ( 18 Oct 2018 07:09 )             26.2     Hemoglobin: 8.8 g/dL (10-18 @ 07:09)  Hemoglobin: 8.9 g/dL (10-17 @ 22:59)    10-18    140  |  104  |  8   ----------------------------<  134<H>  3.8   |  24  |  0.89    Ca    7.9<L>      18 Oct 2018 05:57          CAPILLARY BLOOD GLUCOSE      POCT Blood Glucose.: 189 mg/dL (18 Oct 2018 13:47)    Microbiology     RADIOLOGY & ADDITIONAL STUDIES:    EKG:   Personally Reviewed:  [ x] YES     Imaging:   Personally Reviewed:  [ ] YES               [ ] Consultant(s) Notes Reviewed  [x] Care Discussed with Consultants/Other Providers: Trauma Team    [ ] Fall risks identified:    [ ] High risk medications identified:    [ ] Probable osteoporosis    [ ] Possible osteomalacia    [ ] Increased delirium risk    [ ] Delirium and other risks can be reduced by:          -early ambulation          -minimizing "tethers" - IV, oxygen, catheters, etc          -avoiding hypnotics and sedatives          -maintaining hydration/nutrition          -avoid anticholinergics - diphenhydramine, etc          -pain control          -supportive environment    Advanced Directives: [ ] DNR  [ ] No feeding tube  [ ] MOLST in chart  [ ] MOLST completed today  [ ] Unknown TRAUMA/ACUTE CARE SURGERY - [facility] MEDICINE CO-MANAGEMENT INITIAL VISIT NOTE  Contact Number:[phone]    ISRRAEL HDEZ  [phone]    CHIEF COMPLAINT: Patient is a 83y old  Female who presents with a chief complaint of Trauma s/p fall from standing (18 Oct 2018 10:01)      HPI: TRAUMA ACTIVATION LEVEL: consult    MECHANISM OF INJURY: fall from standing        HISTORY OBTAINED VIA [employer] PHONE INTEPRETER.    HPI:  84 yo Mandarin speaking female with A. fib (not on anticoagulation), HTN, HLD, and DM2, gall bladder carcinoma, came to the ER s/p fall. Patient had a recent robotic cholecystectomy and liver resection for gallbladder tumor at [facility] about 6 days ago. Patient was having trouble sleeping the night prior to admission and took 3 tablets of ambien (unknown dosage) she brought from [place]. Patient started having vivid visual and auditory hallucinations after taking the medication for the rest of the evening and didn't get much sleep. This morning when standing up from toilet, she lost her balance and fell to her right side and hit her right flank on the bathtub on the way down. She denies any lightheadedness or dizziness before the fall. She denies head struck or loss of consciousness. She denies having similar events occur in past. Her daughter came into the bathroom after heard the noise and helped her up. Patient was able to ambulate into the kitchen with assistance and had breakfast. Patient was later brought to the [facility] due to persistent flank pain. Patient denies pain anywhere else. Of note, patient was sent home with rolling walker and PO morphine after her surgery but patient prefers to walk on her own and only took Tylenol because of nausea from narcotics. Patient is able to pull 1100 on incentive spirometry consistently.     While at [facility] pt was found to have bilateral pleural effusions and elevated pro-BNP to 5000. TTE ordered here for further evaluation. Pt was found to have right lateral 4-6 rib fracturres.     pt seen and examined with RN at bedside. She is having significant pain /distress on R. flank- has not received any pain medication today. Denies SOB, other chest pain, palpitations, prior cardiac problems.     Pain Symptoms if applicable:             	                         none	   mild         moderate         severe  	                            0	    1-3	     4-6	         7-10  Pain: moderate to severe  Location:	R flank  Modifying factors: worse with movement, deep inspiration.    Allergies    No Known Allergies          acetaminophen   Tablet .. 975 milliGRAM(s) Oral every 6 hours  amLODIPine   Tablet 5 milliGRAM(s) Oral daily  dextrose 40% Gel 15 Gram(s) Oral once PRN  dextrose 5%. 1000 milliLiter(s) IV Continuous <Continuous>  dextrose 50% Injectable 12.5 Gram(s) IV Push once  dextrose 50% Injectable 25 Gram(s) IV Push once  dextrose 50% Injectable 25 Gram(s) IV Push once  enalapril 20 milliGRAM(s) Oral daily  enoxaparin Injectable 40 milliGRAM(s) SubCutaneous daily  glucagon  Injectable 1 milliGRAM(s) IntraMuscular once PRN  influenza   Vaccine 0.5 milliLiter(s) IntraMuscular once  insulin lispro (HumaLOG) corrective regimen sliding scale   SubCutaneous three times a day before meals  insulin lispro (HumaLOG) corrective regimen sliding scale   SubCutaneous at bedtime  isosorbide   dinitrate Tablet (ISORDIL) 5 milliGRAM(s) Oral two times a day  lidocaine   Patch 1 Patch Transdermal daily  oxyCODONE    IR 5 milliGRAM(s) Oral every 4 hours PRN  traMADol 25 milliGRAM(s) Oral every 6 hours      MEDICATIONS  (STANDING):  acetaminophen   Tablet .. 975 milliGRAM(s) Oral every 6 hours  amLODIPine   Tablet 5 milliGRAM(s) Oral daily  dextrose 5%. 1000 milliLiter(s) (50 mL/Hr) IV Continuous <Continuous>  dextrose 50% Injectable 12.5 Gram(s) IV Push once  dextrose 50% Injectable 25 Gram(s) IV Push once  dextrose 50% Injectable 25 Gram(s) IV Push once  enalapril 20 milliGRAM(s) Oral daily  enoxaparin Injectable 40 milliGRAM(s) SubCutaneous daily  influenza   Vaccine 0.5 milliLiter(s) IntraMuscular once  insulin lispro (HumaLOG) corrective regimen sliding scale   SubCutaneous three times a day before meals  insulin lispro (HumaLOG) corrective regimen sliding scale   SubCutaneous at bedtime  isosorbide   dinitrate Tablet (ISORDIL) 5 milliGRAM(s) Oral two times a day  lidocaine   Patch 1 Patch Transdermal daily  traMADol 25 milliGRAM(s) Oral every 6 hours    MEDICATIONS  (PRN):  dextrose 40% Gel 15 Gram(s) Oral once PRN Blood Glucose LESS THAN 70 milliGRAM(s)/deciliter  glucagon  Injectable 1 milliGRAM(s) IntraMuscular once PRN Glucose LESS THAN 70 milligrams/deciliter  oxyCODONE    IR 5 milliGRAM(s) Oral every 4 hours PRN break through      PAST MEDICAL & SURGICAL HISTORY:  Afib- unclear why not on AC  DM (diabetes mellitus)  HTN (hypertension)  Carcinoma gallbladder: s/p robotic cholecystectomy and liver resection  [x ] Reviewed     Functional Assessment: [ ] Independent  [x ] Assistance  [ ] Total care  [ ] Non-ambulatory  uses cane for assistance, lives with family    SOCIAL HISTORY:  Residence: [ ] shelter  [ ] SNF  [ x] Community  [ ] Substance abuse: denies  [ ] Tobacco: denies  [ ] Alcohol use:     FAMILY HISTORY:  No pertinent family history in first degree relatives  [ x] No pertinent family history in first degree relatives     REVIEW OF SYSTEMS    CONSTITUTIONAL: No fever, weight loss, or fatigue  EYES: No eye pain, visual disturbances, or discharge  ENMT:  No difficulty hearing, tinnitus, vertigo; No sinus or throat pain  NECK: No pain or stiffness  RESPIRATORY: No cough, wheezing, chills or hemoptysis; No shortness of breath  CARDIOVASCULAR: No chest pain, palpitations, dizziness, or leg swelling  GASTROINTESTINAL: No abdominal or epigastric pain. No nausea, vomiting, or hematemesis; No diarrhea or constipation. No melena or hematochezia.  GENITOURINARY: No dysuria, frequency, hematuria, or incontinence  NEUROLOGICAL: No headaches, memory loss, loss of strength, numbness, or tremors  SKIN: No itching, burning, rashes, or lesions   MUSCULOSKELETAL: + RIGHT back and chest pain with dec ROM 2/2 pain  PSYCHIATRIC: No depression, anxiety, mood swings, or difficulty sleeping  HEME/LYMPH: + bruising from fall      PHYSICAL EXAM:    T(C): 36.4 (10-18-18 @ 13:40), Max: 37.2 (10-17-18 @ 23:43)  HR: 64 (10-18-18 @ 14:32) (60 - 80)  BP: 145/80 (10-18-18 @ 14:32) (134/63 - 172/91)  RR: 18 (10-18-18 @ 13:40) (16 - 20)  SpO2: 94% (10-18-18 @ 14:32) (94% - 100%)    GENERAL: elderly Chinese female, appears in distress 2/2 pain  HEAD:  Atraumatic, Normocephalic  EYES: EOMI, PERRLA, conjunctiva and sclera clear  ENMT: Moist mucous membranes, poor dentition   NECK: Supple, No JVD  RESPIRATORY: difficult exam 2/2 pain. dec BS at bases.  CARDIOVASCULAR: irregularly irregular, no murmurs appreciated  right chest wall is tender to touch, diffuse ecchymoses, limited ROM 2/2 pain  GASTROINTESTINAL: Soft, Nontender, Nondistended; Bowel sounds present  GENITOURINARY: + external urinary catheter device  EXTREMITIES:  2+ Peripheral Pulses, No clubbing, cyanosis, or edema  NERVOUS SYSTEM:  Alert & Oriented X3; Moving all 4 extremities; No gross sensory deficits  SKIN: diffuse Right chest wall ecchymoses extending to anterior suprapubic region    LABS:                        8.8    5.75  )-----------( 150      ( 18 Oct 2018 07:09 )             26.2     Hemoglobin: 8.8 g/dL (10-18 @ 07:09)  Hemoglobin: 8.9 g/dL (10-17 @ 22:59)    10-18    140  |  104  |  8   ----------------------------<  134<H>  3.8   |  24  |  0.89    Ca    7.9<L>      18 Oct 2018 05:57          CAPILLARY BLOOD GLUCOSE      POCT Blood Glucose.: 189 mg/dL (18 Oct 2018 13:47)    Microbiology     RADIOLOGY & ADDITIONAL STUDIES:    EKG:   Personally Reviewed:  [ x] YES   EKG , HR 65, no acute ischemic changes    Imaging:   Personally Reviewed:  [ x] YES   clear lungs, small bilateral atelectasis    TTE  Conclusions:  1. Mild mitral annular calcification, otherwise normal  mitral valve. Minimal mitral regurgitation.  2. Calcified trileaflet aortic valve with normal opening.  Mild aortic regurgitation.  3. Hyperdynamic left ventricular systolic function.  4. The right ventricle is not well visualized; right  ventricle appears enlarged with normal right ventricular  systolic function.  5. Normal tricuspid valve. Severe tricuspid regurgitation.  6. Estimated pulmonary artery systolic pressure equals 54  mm Hg, assuming right atrial pressure equals 8 mm Hg,  consistent with moderate pulmonary pressures.  7. Normal pericardium with trace pericardial effusion.  *** No previous Echo exam.              [x] Care Discussed with Consultants/Other Providers: Trauma Team    [x ] Fall risks identified: elderly, walks with cane    [x ] High risk medications identified: AMBIEN    [X ] Probable osteoporosis      [x ] Increased delirium risk    [ x] Delirium and other risks can be reduced by:          -early ambulation          -minimizing "tethers" - IV, oxygen, catheters, etc          -avoiding hypnotics and sedatives          -maintaining hydration/nutrition          -avoid anticholinergics - diphenhydramine, etc          -pain control          -supportive environment    Advanced Directives: [ ] DNR  [ ] No feeding tube  [ ] MOLST in chart  [ ] MOLST completed today  [ x] Unknown

## 2018-10-18 NOTE — CONSULT NOTE ADULT - PROBLEM SELECTOR RECOMMENDATION 9
right 4-6 rib fractures, pt with significant pain.   -agree with standing tylenol and tramadol today for mod to severe pain  -oxycodone 5mg prn, use with caution given pt higher risk for confusion/delirium  -out of bed to chair  -PT  -continue incentive bhavna  -bowel regimen

## 2018-10-19 VITALS
RESPIRATION RATE: 18 BRPM | OXYGEN SATURATION: 95 % | TEMPERATURE: 98 F | HEART RATE: 65 BPM | DIASTOLIC BLOOD PRESSURE: 69 MMHG | SYSTOLIC BLOOD PRESSURE: 125 MMHG

## 2018-10-19 DIAGNOSIS — Z71.89 OTHER SPECIFIED COUNSELING: ICD-10-CM

## 2018-10-19 PROBLEM — E11.9 TYPE 2 DIABETES MELLITUS WITHOUT COMPLICATIONS: Chronic | Status: ACTIVE | Noted: 2018-10-17

## 2018-10-19 PROBLEM — I10 ESSENTIAL (PRIMARY) HYPERTENSION: Chronic | Status: ACTIVE | Noted: 2018-10-17

## 2018-10-19 PROBLEM — I48.91 UNSPECIFIED ATRIAL FIBRILLATION: Chronic | Status: ACTIVE | Noted: 2018-10-17

## 2018-10-19 LAB
ANION GAP SERPL CALC-SCNC: 10 MMOL/L — SIGNIFICANT CHANGE UP (ref 5–17)
BUN SERPL-MCNC: 13 MG/DL — SIGNIFICANT CHANGE UP (ref 7–23)
CALCIUM SERPL-MCNC: 8 MG/DL — LOW (ref 8.4–10.5)
CHLORIDE SERPL-SCNC: 101 MMOL/L — SIGNIFICANT CHANGE UP (ref 96–108)
CO2 SERPL-SCNC: 24 MMOL/L — SIGNIFICANT CHANGE UP (ref 22–31)
CREAT SERPL-MCNC: 0.88 MG/DL — SIGNIFICANT CHANGE UP (ref 0.5–1.3)
GLUCOSE SERPL-MCNC: 152 MG/DL — HIGH (ref 70–99)
HCT VFR BLD CALC: 25.8 % — LOW (ref 34.5–45)
HGB BLD-MCNC: 8.5 G/DL — LOW (ref 11.5–15.5)
MCHC RBC-ENTMCNC: 32.3 PG — SIGNIFICANT CHANGE UP (ref 27–34)
MCHC RBC-ENTMCNC: 32.9 GM/DL — SIGNIFICANT CHANGE UP (ref 32–36)
MCV RBC AUTO: 98.1 FL — SIGNIFICANT CHANGE UP (ref 80–100)
PLATELET # BLD AUTO: 174 K/UL — SIGNIFICANT CHANGE UP (ref 150–400)
POTASSIUM SERPL-MCNC: 3.9 MMOL/L — SIGNIFICANT CHANGE UP (ref 3.5–5.3)
POTASSIUM SERPL-SCNC: 3.9 MMOL/L — SIGNIFICANT CHANGE UP (ref 3.5–5.3)
RBC # BLD: 2.63 M/UL — LOW (ref 3.8–5.2)
RBC # FLD: 14.9 % — HIGH (ref 10.3–14.5)
SODIUM SERPL-SCNC: 135 MMOL/L — SIGNIFICANT CHANGE UP (ref 135–145)
WBC # BLD: 5.13 K/UL — SIGNIFICANT CHANGE UP (ref 3.8–10.5)
WBC # FLD AUTO: 5.13 K/UL — SIGNIFICANT CHANGE UP (ref 3.8–10.5)

## 2018-10-19 PROCEDURE — 80048 BASIC METABOLIC PNL TOTAL CA: CPT

## 2018-10-19 PROCEDURE — 99233 SBSQ HOSP IP/OBS HIGH 50: CPT

## 2018-10-19 PROCEDURE — 71045 X-RAY EXAM CHEST 1 VIEW: CPT

## 2018-10-19 PROCEDURE — 83036 HEMOGLOBIN GLYCOSYLATED A1C: CPT

## 2018-10-19 PROCEDURE — 86900 BLOOD TYPING SEROLOGIC ABO: CPT

## 2018-10-19 PROCEDURE — 85027 COMPLETE CBC AUTOMATED: CPT

## 2018-10-19 PROCEDURE — 86850 RBC ANTIBODY SCREEN: CPT

## 2018-10-19 PROCEDURE — 99285 EMERGENCY DEPT VISIT HI MDM: CPT

## 2018-10-19 PROCEDURE — 86901 BLOOD TYPING SEROLOGIC RH(D): CPT

## 2018-10-19 PROCEDURE — 93306 TTE W/DOPPLER COMPLETE: CPT

## 2018-10-19 PROCEDURE — 82962 GLUCOSE BLOOD TEST: CPT

## 2018-10-19 PROCEDURE — 97162 PT EVAL MOD COMPLEX 30 MIN: CPT

## 2018-10-19 RX ORDER — ACETAMINOPHEN 500 MG
3 TABLET ORAL
Qty: 0 | Refills: 0 | COMMUNITY
Start: 2018-10-19

## 2018-10-19 RX ORDER — TRAMADOL HYDROCHLORIDE 50 MG/1
0.5 TABLET ORAL
Qty: 8 | Refills: 0 | OUTPATIENT
Start: 2018-10-19

## 2018-10-19 RX ADMIN — Medication 975 MILLIGRAM(S): at 00:49

## 2018-10-19 RX ADMIN — ISOSORBIDE DINITRATE 5 MILLIGRAM(S): 5 TABLET ORAL at 06:14

## 2018-10-19 RX ADMIN — TRAMADOL HYDROCHLORIDE 25 MILLIGRAM(S): 50 TABLET ORAL at 14:42

## 2018-10-19 RX ADMIN — TRAMADOL HYDROCHLORIDE 25 MILLIGRAM(S): 50 TABLET ORAL at 04:33

## 2018-10-19 RX ADMIN — AMLODIPINE BESYLATE 5 MILLIGRAM(S): 2.5 TABLET ORAL at 06:14

## 2018-10-19 RX ADMIN — Medication 975 MILLIGRAM(S): at 06:15

## 2018-10-19 RX ADMIN — Medication 975 MILLIGRAM(S): at 06:45

## 2018-10-19 RX ADMIN — LIDOCAINE 1 PATCH: 4 CREAM TOPICAL at 11:20

## 2018-10-19 RX ADMIN — ENOXAPARIN SODIUM 40 MILLIGRAM(S): 100 INJECTION SUBCUTANEOUS at 11:21

## 2018-10-19 RX ADMIN — Medication 1: at 09:21

## 2018-10-19 RX ADMIN — TRAMADOL HYDROCHLORIDE 25 MILLIGRAM(S): 50 TABLET ORAL at 03:33

## 2018-10-19 RX ADMIN — Medication 975 MILLIGRAM(S): at 11:21

## 2018-10-19 RX ADMIN — Medication 20 MILLIGRAM(S): at 06:13

## 2018-10-19 RX ADMIN — Medication 975 MILLIGRAM(S): at 01:19

## 2018-10-19 RX ADMIN — TRAMADOL HYDROCHLORIDE 25 MILLIGRAM(S): 50 TABLET ORAL at 11:21

## 2018-10-19 RX ADMIN — OXYCODONE HYDROCHLORIDE 5 MILLIGRAM(S): 5 TABLET ORAL at 13:53

## 2018-10-19 RX ADMIN — Medication 3: at 11:36

## 2018-10-19 NOTE — DISCHARGE NOTE ADULT - CARE PLAN
Principal Discharge DX:	Rib fractures  Goal:	Recovery  Secondary Diagnosis:	Atrial fibrillation  Goal:	decrease risk factors  Assessment and plan of treatment:	Follow-up with cardiologist, Dr. Kodi Saunders within 1-2 weeks.  Please call [phone] for appointment.  Secondary Diagnosis:	Diabetes  Goal:	Maintain blood sugar levels  Assessment and plan of treatment:	Please follow up with your primary care physician.  Please schedule an appointment with your primary care provider within two weeks. Principal Discharge DX:	Rib fractures  Goal:	Recovery  Assessment and plan of treatment:	1.  Diabetic diet  2.  Activity as tolerated  3.  Follow-up with Dr. Bragg within 1-2 weeks.  Please call office for appointment.  Please follow up with your primary care physician.  Please schedule an appointment with your primary care provider within two weeks.  Secondary Diagnosis:	Atrial fibrillation  Goal:	decrease risk factors  Assessment and plan of treatment:	Follow-up with cardiologist, Dr. Kodi Saunders within 1-2 weeks.  Please call [phone] for appointment.  Secondary Diagnosis:	Diabetes  Goal:	Maintain blood sugar levels  Assessment and plan of treatment:	Please follow up with your primary care physician.  Please schedule an appointment with your primary care provider within two weeks. Principal Discharge DX:	Rib fractures  Goal:	Recovery  Assessment and plan of treatment:	1.  Diabetic diet  2.  Activity as tolerated  3.  Follow-up with Dr. Bragg within 1-2 weeks.  Please call office for appointment.  Please follow up with your primary care physician.  Please schedule an appointment with your primary care provider within two weeks.  Secondary Diagnosis:	Atrial fibrillation  Goal:	decrease risk factors for stroke  Assessment and plan of treatment:	Follow-up with cardiologist, Dr. Kodi Saunders within 1-2 weeks.  Please call [phone] for appointment.  Secondary Diagnosis:	Diabetes  Goal:	Maintain blood sugar levels  Assessment and plan of treatment:	Please follow up with your primary care physician.  Please schedule an appointment with your primary care provider within two weeks.

## 2018-10-19 NOTE — PROGRESS NOTE ADULT - ASSESSMENT
Patient is a 83y old female with A. fib (not on anticoagulation), HTN, HLD, and DM  s/p robotic cholecystectomy and liver resection (10/11) presented as a transfer s/p standing from fall with right 4 to 6 lateral rib fracture with bilateral pleural effusion. Patient is hemodynamically stable and able to pull 1100 on incentive spirometry consistently. OSH workup showed elevated BNP at > 5000     PLAN:  - F/u a/c plan---> pt is high risk (CHADSVASC=5), but not on a/c at home for afib--->obtain collateral   - Multimodal pain control with Tylenol, Motrin and lidocaine patch, judicious use of narcotics and sedation medication given history of hallucination   - TTE showing severe mitral regurg and CT chest (from OSH) showing b/l pleural effusions---> d/w pt's cardiologist--->cleared for discharge with outpatient f/u  - Encourage incentive spirometry use  - PT rec-->home PT

## 2018-10-19 NOTE — PROGRESS NOTE ADULT - PROBLEM SELECTOR PLAN 3
pt found to have pleural effusions and elevated BNP at outside hosptial  -TTE here with moderate pulm pressures, enlarged RV, tricuspid regurgitation concerning for pulmonary HTN. pt should follow up with her cardiologist to further assess these findings  -CXR with clear lungs  -pt with normal O2 saturation/breathing comfortably on room air. No need for diuresis at this time.

## 2018-10-19 NOTE — PROGRESS NOTE ADULT - PROBLEM SELECTOR PLAN 1
right 4-6 rib fractures, pt with significant pain with some improvement today  -agree with standing tylenol and tramadol  for mod to severe pain  -oxycodone 5mg prn, use with caution given pt higher risk for confusion/delirium  -out of bed to chair  -PT-- home PT  -continue incentive bhavna  -bowel regimen.

## 2018-10-19 NOTE — PROGRESS NOTE ADULT - PROBLEM SELECTOR PLAN 4
rate controlled. Not on AC. chadvasc of 5, high risk patient.  -pt unclear why she is not on AC, but believe it to be high risk 2/2 falls at this time.   -advised pt to follow up with her cardiologist when clinically improved to readdress this issue. trauma team also spoke with pts cardiologist.  -no need for rate control medication at this time.

## 2018-10-19 NOTE — PROGRESS NOTE ADULT - SUBJECTIVE AND OBJECTIVE BOX
Patient is a 83y old  Female who presents with a chief complaint of Trauma s/p fall from standing (19 Oct 2018 08:47)      SUBJECTIVE / OVERNIGHT EVENTS:    no overnight events. pt worked with PT and recommended home PT. pt continues to have uncontrolled right rib cage pain.   denies sob, abdominal pain, or other symptoms. visualized pt get up and walk to bathroom with assistance (walker supervised by PCA)  spoke to patient with [facility] phone  with case management Kiara at bedside.   pt is agreeable to go home today- pending family ability to pick her up.    MEDICATIONS  (STANDING):  acetaminophen   Tablet .. 975 milliGRAM(s) Oral every 6 hours  amLODIPine   Tablet 5 milliGRAM(s) Oral daily  atorvastatin 10 milliGRAM(s) Oral at bedtime  dextrose 5%. 1000 milliLiter(s) (50 mL/Hr) IV Continuous <Continuous>  dextrose 50% Injectable 25 Gram(s) IV Push once  dextrose 50% Injectable 12.5 Gram(s) IV Push once  dextrose 50% Injectable 25 Gram(s) IV Push once  enalapril 20 milliGRAM(s) Oral daily  enoxaparin Injectable 40 milliGRAM(s) SubCutaneous daily  influenza   Vaccine 0.5 milliLiter(s) IntraMuscular once  insulin lispro (HumaLOG) corrective regimen sliding scale   SubCutaneous three times a day before meals  insulin lispro (HumaLOG) corrective regimen sliding scale   SubCutaneous at bedtime  isosorbide   dinitrate Tablet (ISORDIL) 5 milliGRAM(s) Oral two times a day  lidocaine   Patch 1 Patch Transdermal daily  traMADol 25 milliGRAM(s) Oral every 6 hours    MEDICATIONS  (PRN):  dextrose 40% Gel 15 Gram(s) Oral once PRN Blood Glucose LESS THAN 70 milliGRAM(s)/deciliter  glucagon  Injectable 1 milliGRAM(s) IntraMuscular once PRN Glucose LESS THAN 70 milligrams/deciliter  oxyCODONE    IR 5 milliGRAM(s) Oral every 4 hours PRN break through        CAPILLARY BLOOD GLUCOSE      POCT Blood Glucose.: 255 mg/dL (19 Oct 2018 11:34)  POCT Blood Glucose.: 163 mg/dL (19 Oct 2018 08:35)  POCT Blood Glucose.: 200 mg/dL (18 Oct 2018 21:24)  POCT Blood Glucose.: 146 mg/dL (18 Oct 2018 18:07)  POCT Blood Glucose.: 189 mg/dL (18 Oct 2018 13:47)    I&O's Summary    18 Oct 2018 07:01  -  19 Oct 2018 07:00  --------------------------------------------------------  IN: 880 mL / OUT: 551 mL / NET: 329 mL      T 98.2, P 62, /65, R 18, O2 95% RA  PHYSICAL EXAM:  GENERAL: elderly Chinese female, mild distress 2/2 pain, improved from yesterday  HEAD:  Atraumatic, Normocephalic  RESPIRATORY: difficult exam 2/2 pain. dec BS at bases.  CARDIOVASCULAR: irregularly irregular, no murmurs appreciated  right chest wall is tender to touch, diffuse ecchymoses, limited ROM 2/2 pain  GASTROINTESTINAL: Soft, Nontender, Nondistended; Bowel sounds present  EXTREMITIES:  2+ Peripheral Pulses, No clubbing, cyanosis, or edema  NERVOUS SYSTEM:  Alert & Oriented X3; Moving all 4 extremities; No gross sensory deficits  SKIN: diffuse Right chest wall ecchymoses     LABS:                        8.5    5.13  )-----------( 174      ( 19 Oct 2018 07:59 )             25.8     10-19    135  |  101  |  13  ----------------------------<  152<H>  3.9   |  24  |  0.88    Ca    8.0<L>      19 Oct 2018 06:34                RADIOLOGY & ADDITIONAL TESTS:     Care Discussed with Consultants/Other Providers: trauma, case management.

## 2018-10-19 NOTE — DISCHARGE NOTE ADULT - PLAN OF CARE
Recovery decrease risk factors Follow-up with cardiologist, Dr. Kodi Saunders within 1-2 weeks.  Please call [phone] for appointment. Maintain blood sugar levels Please follow up with your primary care physician.  Please schedule an appointment with your primary care provider within two weeks. 1.  Diabetic diet  2.  Activity as tolerated  3.  Follow-up with Dr. Bragg within 1-2 weeks.  Please call office for appointment.  Please follow up with your primary care physician.  Please schedule an appointment with your primary care provider within two weeks. decrease risk factors for stroke

## 2018-10-19 NOTE — DISCHARGE NOTE ADULT - PATIENT PORTAL LINK FT
You can access the [facility] Patient Portal, offered by [facility], by registering with the following website: [email or web address]

## 2018-10-19 NOTE — DISCHARGE NOTE ADULT - CARE PROVIDER_API CALL
Sarath Bragg), Surgery; Surgical Critical Care  [address] [place] [address]  Phone: [phone]  Fax: [phone]

## 2018-10-19 NOTE — DISCHARGE NOTE ADULT - HOSPITAL COURSE
Patient is a 83y old female with A. fib (not on anticoagulation), HTN, HLD, and DM  s/p robotic cholecystectomy and liver resection (10/11) presented as a transfer s/p standing from fall with right 4 to 6 lateral rib fracture with bilateral pleural effusion. Patient is hemodynamically stable and able to pull 1100 on incentive spirometry consistently. OSH workup showed elevated BNP at > 5000.  She was admitted to the surgical service.  TTE performed which showed   TTE  Conclusions:  1. Mild mitral annular calcification, otherwise normal  mitral valve. Minimal mitral regurgitation.  2. Calcified trileaflet aortic valve with normal opening.  Mild aortic regurgitation.  3. Hyperdynamic left ventricular systolic function.  4. The right ventricle is not well visualized; right  ventricle appears enlarged with normal right ventricular  systolic function.  5. Normal tricuspid valve. Severe tricuspid regurgitation.  6. Estimated pulmonary artery systolic pressure equals 54  mm Hg, assuming right atrial pressure equals 8 mm Hg,  consistent with moderate pulmonary pressures.  7. Normal pericardium with trace pericardial effusion.  *** No previous Echo exam.  Per conversation with her cardiologist, these are not new findings and she can follow-up with him as outpatient.  She was evaluated by physical therapy, who recommended home PT upon discharge.  Pain was controlled with multimodal medications.  She is ambulating with assistance, is voiding, tolerating a diet, and is stable for discharge home.  She will follow-up with Dr. Mahsa Dr. Ma (cardiologist) and her PMD within 1-2 weeks. Patient is a 83y old female with A. fib (not on anticoagulation), HTN, HLD, and DM  s/p robotic cholecystectomy and liver resection (10/11) presented as a transfer s/p standing from fall with right 4 to 6 lateral rib fracture with bilateral pleural effusion. Patient is hemodynamically stable and able to pull 1100 on incentive spirometry consistently. OSH workup showed elevated BNP at > 5000.  She was admitted to the surgical service.  TTE performed which showed   TTE  Conclusions:  1. Mild mitral annular calcification, otherwise normal  mitral valve. Minimal mitral regurgitation.  2. Calcified trileaflet aortic valve with normal opening.  Mild aortic regurgitation.  3. Hyperdynamic left ventricular systolic function.  4. The right ventricle is not well visualized; right  ventricle appears enlarged with normal right ventricular  systolic function.  5. Normal tricuspid valve. Severe tricuspid regurgitation.  6. Estimated pulmonary artery systolic pressure equals 54  mm Hg, assuming right atrial pressure equals 8 mm Hg,  consistent with moderate pulmonary pressures.  7. Normal pericardium with trace pericardial effusion.  *** No previous Echo exam.  Per conversation with her cardiologist (Dr. Saunders), these are not new findings and she can follow-up with him as outpatient.  She was evaluated by physical therapy, who recommended home PT upon discharge.  Pain was controlled with multimodal medications.  She is ambulating with assistance, is voiding, tolerating a diet, and is stable for discharge home.  She will follow-up with Dr. Mahsa Dr. Ma (cardiologist) and her PMD within 1-2 weeks.

## 2018-10-19 NOTE — PROGRESS NOTE ADULT - ATTENDING COMMENTS
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

## 2018-10-19 NOTE — PROGRESS NOTE ADULT - SUBJECTIVE AND OBJECTIVE BOX
GENERAL SURGERY DAILY PROGRESS NOTE:       Subjective:  Pain controlled. Denies N/V. Tolerating diet. Passing flatus and BM.        Objective:    PE:  Gen: NAD  Resp: airway patent, respirations unlabored, no increased WoB. + R chest wall TTP   CVS: RRR  Abd: soft, ND, NT, no rebound or guarding. slight ecchymosis around port site which has crusted dermabond, there is a moderate sized ecchymosis extending just below the costal margin to the right anterior superior iliac crest, which has been there since surgery prior to fall.   Ext: no edema, WWP  Neuro: AAOx3, no focal deficits    Vital Signs Last 24 Hrs  T(C): 36.7 (18 Oct 2018 23:48), Max: 36.9 (18 Oct 2018 05:03)  T(F): 98.1 (18 Oct 2018 23:48), Max: 98.5 (18 Oct 2018 21:12)  HR: 60 (18 Oct 2018 23:48) (60 - 68)  BP: 125/70 (18 Oct 2018 23:48) (125/70 - 162/72)  BP(mean): --  RR: 18 (18 Oct 2018 23:48) (16 - 18)  SpO2: 94% (18 Oct 2018 23:48) (93% - 100%)    I&O's Detail    18 Oct 2018 07:01  -  19 Oct 2018 00:14  --------------------------------------------------------  IN:    Oral Fluid: 880 mL  Total IN: 880 mL    OUT:    Voided: 200 mL  Total OUT: 200 mL    Total NET: 680 mL          Daily     Daily     MEDICATIONS  (STANDING):  acetaminophen   Tablet .. 975 milliGRAM(s) Oral every 6 hours  amLODIPine   Tablet 5 milliGRAM(s) Oral daily  atorvastatin 10 milliGRAM(s) Oral at bedtime  dextrose 5%. 1000 milliLiter(s) (50 mL/Hr) IV Continuous <Continuous>  dextrose 50% Injectable 25 Gram(s) IV Push once  dextrose 50% Injectable 12.5 Gram(s) IV Push once  dextrose 50% Injectable 25 Gram(s) IV Push once  enalapril 20 milliGRAM(s) Oral daily  enoxaparin Injectable 40 milliGRAM(s) SubCutaneous daily  influenza   Vaccine 0.5 milliLiter(s) IntraMuscular once  insulin lispro (HumaLOG) corrective regimen sliding scale   SubCutaneous three times a day before meals  insulin lispro (HumaLOG) corrective regimen sliding scale   SubCutaneous at bedtime  isosorbide   dinitrate Tablet (ISORDIL) 5 milliGRAM(s) Oral two times a day  lidocaine   Patch 1 Patch Transdermal daily  traMADol 25 milliGRAM(s) Oral every 6 hours    MEDICATIONS  (PRN):  dextrose 40% Gel 15 Gram(s) Oral once PRN Blood Glucose LESS THAN 70 milliGRAM(s)/deciliter  glucagon  Injectable 1 milliGRAM(s) IntraMuscular once PRN Glucose LESS THAN 70 milligrams/deciliter  oxyCODONE    IR 5 milliGRAM(s) Oral every 4 hours PRN break through      LABS:                        8.8    5.75  )-----------( 150      ( 18 Oct 2018 07:09 )             26.2     10-18    140  |  104  |  8   ----------------------------<  134<H>  3.8   |  24  |  0.89    Ca    7.9<L>      18 Oct 2018 05:57  Mg     2.1     10-    TPro  6.4  /  Alb  3.1<L>  /  TBili  1.1  /  DBili  x   /  AST  66<H>  /  ALT  83<H>  /  AlkPhos  65  10-    PT/INR - ( 17 Oct 2018 10:45 )   PT: 13.1 sec;   INR: 1.20 ratio         PTT - ( 17 Oct 2018 10:45 )  PTT:27.6 sec  Urinalysis Basic - ( 17 Oct 2018 10:59 )    Color: Yellow / Appearance: Clear / S.010 / pH: x  Gluc: x / Ketone: Negative  / Bili: Negative / Urobili: 1   Blood: x / Protein: 100 / Nitrite: Negative   Leuk Esterase: Trace / RBC: 2-5 /HPF / WBC 0-2 /HPF   Sq Epi: x / Non Sq Epi: Many /HPF / Bacteria: Trace /HPF        RADIOLOGY & ADDITIONAL STUDIES: GENERAL SURGERY DAILY PROGRESS NOTE:       Subjective:  Patient seen and examined at bedside. No acute overnight events. Pain controlled. Denies N/V. Tolerating diet. Passing flatus and BM.        Objective:    PE:  Gen: NAD  Resp: airway patent, respirations unlabored, no increased WoB. + R chest wall TTP   CVS: RRR  Abd: soft, ND, NT, no rebound or guarding. slight ecchymosis around port site which has crusted dermabond, there is a moderate sized ecchymosis extending just below the costal margin to the right anterior superior iliac crest, which has been there since surgery prior to .   Ext: no edema, WWP  Neuro: AAOx3, no focal deficits    Vital Signs Last 24 Hrs  T(C): 36.7 (18 Oct 2018 23:48), Max: 36.9 (18 Oct 2018 05:03)  T(F): 98.1 (18 Oct 2018 23:48), Max: 98.5 (18 Oct 2018 21:12)  HR: 60 (18 Oct 2018 23:48) (60 - 68)  BP: 125/70 (18 Oct 2018 23:48) (125/70 - 162/72)  BP(mean): --  RR: 18 (18 Oct 2018 23:48) (16 - 18)  SpO2: 94% (18 Oct 2018 23:48) (93% - 100%)    I&O's Detail    18 Oct 2018 07:01  -  19 Oct 2018 00:14  --------------------------------------------------------  IN:    Oral Fluid: 880 mL  Total IN: 880 mL    OUT:    Voided: 200 mL  Total OUT: 200 mL    Total NET: 680 mL          Daily     Daily     MEDICATIONS  (STANDING):  acetaminophen   Tablet .. 975 milliGRAM(s) Oral every 6 hours  amLODIPine   Tablet 5 milliGRAM(s) Oral daily  atorvastatin 10 milliGRAM(s) Oral at bedtime  dextrose 5%. 1000 milliLiter(s) (50 mL/Hr) IV Continuous <Continuous>  dextrose 50% Injectable 25 Gram(s) IV Push once  dextrose 50% Injectable 12.5 Gram(s) IV Push once  dextrose 50% Injectable 25 Gram(s) IV Push once  enalapril 20 milliGRAM(s) Oral daily  enoxaparin Injectable 40 milliGRAM(s) SubCutaneous daily  influenza   Vaccine 0.5 milliLiter(s) IntraMuscular once  insulin lispro (HumaLOG) corrective regimen sliding scale   SubCutaneous three times a day before meals  insulin lispro (HumaLOG) corrective regimen sliding scale   SubCutaneous at bedtime  isosorbide   dinitrate Tablet (ISORDIL) 5 milliGRAM(s) Oral two times a day  lidocaine   Patch 1 Patch Transdermal daily  traMADol 25 milliGRAM(s) Oral every 6 hours    MEDICATIONS  (PRN):  dextrose 40% Gel 15 Gram(s) Oral once PRN Blood Glucose LESS THAN 70 milliGRAM(s)/deciliter  glucagon  Injectable 1 milliGRAM(s) IntraMuscular once PRN Glucose LESS THAN 70 milligrams/deciliter  oxyCODONE    IR 5 milliGRAM(s) Oral every 4 hours PRN break through      LABS:                        8.8    5.75  )-----------( 150      ( 18 Oct 2018 07:09 )             26.2     10-18    140  |  104  |  8   ----------------------------<  134<H>  3.8   |  24  |  0.89    Ca    7.9<L>      18 Oct 2018 05:57  Mg     2.1     10-    TPro  6.4  /  Alb  3.1<L>  /  TBili  1.1  /  DBili  x   /  AST  66<H>  /  ALT  83<H>  /  AlkPhos  65  10-17    PT/INR - ( 17 Oct 2018 10:45 )   PT: 13.1 sec;   INR: 1.20 ratio         PTT - ( 17 Oct 2018 10:45 )  PTT:27.6 sec  Urinalysis Basic - ( 17 Oct 2018 10:59 )    Color: Yellow / Appearance: Clear / S.010 / pH: x  Gluc: x / Ketone: Negative  / Bili: Negative / Urobili: 1   Blood: x / Protein: 100 / Nitrite: Negative   Leuk Esterase: Trace / RBC: 2-5 /HPF / WBC 0-2 /HPF   Sq Epi: x / Non Sq Epi: Many /HPF / Bacteria: Trace /HPF        RADIOLOGY & ADDITIONAL STUDIES:

## 2018-10-19 NOTE — DISCHARGE NOTE ADULT - MEDICATION SUMMARY - MEDICATIONS TO TAKE
I will START or STAY ON the medications listed below when I get home from the hospital:    acetaminophen 325 mg oral tablet  -- 3 tab(s) by mouth every 6 hours  -- Indication: For Mild to moderate pain    traMADol 50 mg oral tablet  -- 0.5 tab(s) by mouth every 6 hours MDD:3  -- Indication: For severe pain    enalapril 20 mg oral tablet  -- 1 tab(s) by mouth once a day  -- Indication: For blood pressure    isosorbide dinitrate 5 mg oral tablet  -- 1 tab(s) by mouth 2 times a day  -- Indication: For Heart    Januvia 25 mg oral tablet  -- 1 tab(s) by mouth once a day  -- Indication: For Diabetes    glimepiride 4 mg oral tablet  -- 1 tab(s) by mouth once a day  -- Indication: For Diabetes    atorvastatin 10 mg oral tablet  -- 1 tab(s) by mouth once a day  -- Indication: For Cholesterol    amLODIPine 5 mg oral tablet  -- 1 tab(s) by mouth once a day  -- Indication: For blood pressure

## 2018-10-19 NOTE — PROGRESS NOTE ADULT - PROBLEM SELECTOR PLAN 2
2/2 adverse effect of medication. pt took unclear amount of ambien and was experiencing auditory/visual hallucinations that likley lead to confusion and her fall. lower suspicion for cardiogenic syncope at this time.   -PT consult recommending home PT  -avoid ambien and caution with other narcotics, sedatives  -can consider checking vit d level, starting vit d for secondary fall prevention.

## 2018-10-19 NOTE — PROGRESS NOTE ADULT - PROBLEM SELECTOR PLAN 5
on januvia, glimeperide at home  -monitor FS and low dose sliding scale as needed  -lipitor 10mg daily.

## 2018-10-22 LAB
CULTURE RESULTS: SIGNIFICANT CHANGE UP
CULTURE RESULTS: SIGNIFICANT CHANGE UP
SPECIMEN SOURCE: SIGNIFICANT CHANGE UP
SPECIMEN SOURCE: SIGNIFICANT CHANGE UP

## 2022-07-27 NOTE — ED PROVIDER NOTE - ABDOMINAL EXAM
large ecchymosis Rt sided abd, firmness to palp.,/nondistended/soft/tender... Complex Repair And Rhombic Flap Text: The defect edges were debeveled with a #15 scalpel blade.  The primary defect was closed partially with a complex linear closure.  Given the location of the remaining defect, shape of the defect and the proximity to free margins a rhombic flap was deemed most appropriate for complete closure of the defect.  Using a sterile surgical marker, an appropriate advancement flap was drawn incorporating the defect and placing the expected incisions within the relaxed skin tension lines where possible.    The area thus outlined was incised deep to adipose tissue with a #15 scalpel blade.  The skin margins were undermined to an appropriate distance in all directions utilizing iris scissors.

## 2023-05-17 NOTE — ED ADULT NURSE NOTE - DOES PATIENT HAVE ADVANCE DIRECTIVE
"Subjective   Patient ID: Prisca Keene is a 26 y.o. female who presents for Anxiety and Neck Pain (Chronic neck pain and stiffness . ).    Anxiety  Presents for initial visit. Onset was at an unknown time. The problem has been unchanged. Symptoms include nervous/anxious behavior. Symptoms occur rarely. The severity of symptoms is mild. Nothing aggravates the symptoms.     Past treatments include benzodiazephines. The treatment provided significant relief. Compliance with prior treatments has been good.   Neck Pain   This is a chronic problem. The current episode started more than 1 year ago. The problem occurs intermittently. The problem has been unchanged. The pain is associated with nothing. She has tried muscle relaxants for the symptoms. The treatment provided significant relief.        Review of Systems   Musculoskeletal:  Positive for neck pain.   Psychiatric/Behavioral:  The patient is nervous/anxious.        Objective   /68 (BP Location: Left arm, Patient Position: Sitting)   Pulse 74   Temp 36.6 °C (97.8 °F)   Resp 16   Ht 1.702 m (5' 7\")   Wt 79.4 kg (175 lb)   LMP 04/21/2023   SpO2 98%   BMI 27.41 kg/m²     Physical Exam  Constitutional:       Appearance: Normal appearance.   HENT:      Head: Normocephalic and atraumatic.      Right Ear: Tympanic membrane normal.      Left Ear: Tympanic membrane normal.      Nose: Nose normal.      Mouth/Throat:      Mouth: Mucous membranes are moist.   Cardiovascular:      Rate and Rhythm: Normal rate and regular rhythm.   Pulmonary:      Effort: Pulmonary effort is normal.      Breath sounds: Normal breath sounds.   Musculoskeletal:         General: Tenderness present. No swelling or deformity.   Neurological:      Mental Status: She is alert.         Assessment/Plan   Problem List Items Addressed This Visit       Migraines - Primary    Relevant Medications    lamoTRIgine (LaMICtal) 25 mg tablet    Other Relevant Orders    Acupuncture    Generalized " anxiety disorder    Relevant Medications    ALPRAZolam (Xanax) 0.5 mg tablet    Chronic neck pain    Relevant Medications    cyclobenzaprine (Flexeril) 5 mg tablet    meloxicam (Mobic) 15 mg tablet           unknown